# Patient Record
Sex: MALE | Race: WHITE | NOT HISPANIC OR LATINO | Employment: FULL TIME | URBAN - METROPOLITAN AREA
[De-identification: names, ages, dates, MRNs, and addresses within clinical notes are randomized per-mention and may not be internally consistent; named-entity substitution may affect disease eponyms.]

---

## 2018-02-15 ENCOUNTER — OFFICE VISIT (OUTPATIENT)
Dept: ENDOCRINOLOGY | Age: 58
End: 2018-02-15

## 2018-02-15 VITALS
SYSTOLIC BLOOD PRESSURE: 138 MMHG | BODY MASS INDEX: 28.85 KG/M2 | WEIGHT: 213 LBS | OXYGEN SATURATION: 94 % | HEIGHT: 72 IN | HEART RATE: 77 BPM | DIASTOLIC BLOOD PRESSURE: 78 MMHG

## 2018-02-15 DIAGNOSIS — G47.39 OTHER SLEEP APNEA: ICD-10-CM

## 2018-02-15 DIAGNOSIS — E23.0 HYPOGONADOTROPIC HYPOGONADISM (HCC): Primary | ICD-10-CM

## 2018-02-15 PROCEDURE — 99205 OFFICE O/P NEW HI 60 MIN: CPT | Performed by: INTERNAL MEDICINE

## 2018-02-15 RX ORDER — ATORVASTATIN CALCIUM 80 MG/1
80 TABLET, FILM COATED ORAL DAILY
COMMUNITY
End: 2018-02-15

## 2018-02-15 RX ORDER — TESTOSTERONE CYPIONATE 200 MG/ML
INJECTION, SOLUTION INTRAMUSCULAR
Refills: 0 | COMMUNITY
Start: 2018-01-31 | End: 2018-03-16 | Stop reason: SDUPTHER

## 2018-02-15 RX ORDER — CARVEDILOL 6.25 MG/1
6.25 TABLET ORAL 2 TIMES DAILY WITH MEALS
COMMUNITY
End: 2019-04-29

## 2018-02-15 RX ORDER — GLUCOSAMINE SULFATE 500 MG
CAPSULE ORAL
COMMUNITY
End: 2019-05-20

## 2018-02-15 NOTE — PROGRESS NOTES
Chief Complaint   Patient presents with   • Abnormal Lab     TESTOSTERONE    NEW PATIENT APPOINTMENT/ TESTOSTERONE    Favian Pierre,57 y.o. WM is here as new pt for low testosterone levels.    Pt's PCP checked his testosterone about 5 years as he was constantly getting tired in the afternoon, when his levels were low he was started on Testosterone patch and then androgel with no improvement.   He was then started on testosterone injections, has been on them since then. ( for about 4 years now)  Pt is currently on Testosterone 200 mg im 1 week, pt has taken increased dose for the last 2 weeks, last injection was on Saturday 2/10/2018.   His testosterone dose prior to this was 100 mg im 1 week. ( not exactly sure of the doses of his testosterone)    He still c/o fatigue at later part of the week after the injection. Reports that the fatigue is affecting his daily activities. Reports decent libido, decreased erections, still have morning erections, still has normal ejaculations. No change in shaving frequency. Attained puberty at the later age this his peers. Attained puberty at around 14 - 15. He has 2 kids of his own. No TBI, no hx of mumps, no injury to testes.   Currently not on any opiates, steroids, not on any diet supplements for muscle building.   Weight has been relatively stable in the last 2 - 3 years.   Never had Ct scan of brain.    Pt denied headaches, no nipple discharge or breast enlargement. No temporal loss of vision and not on atypical anti-psychotics.  No recent prostate exam and no PSA levels, no c/o increased urination, urgency or hematuria.       Never had sleep study.      Patient has cardiac defibrillator due to remote history of pericarditis and pericardial effusion.  He could not give me more details than this.  He is not compliant with his other medications other than the testosterone.  He is supposed to take metformin and Lipitor which he does not take as he doesn't believe in these  medications.    I have reviewed the patient's allergies, medicines, past medical hx, family hx and social hx in detail.    No past medical history on file.    No family history on file.    Social History     Social History   • Marital status: Unknown     Spouse name: N/A   • Number of children: N/A   • Years of education: N/A     Occupational History   • Not on file.     Social History Main Topics   • Smoking status: Not on file   • Smokeless tobacco: Not on file   • Alcohol use Not on file   • Drug use: Not on file   • Sexual activity: Not on file     Other Topics Concern   • Not on file     Social History Narrative   • No narrative on file       Allergies   Allergen Reactions   • Percocet [Oxycodone-Acetaminophen] Itching         Current Outpatient Prescriptions:   •  carvedilol (COREG) 6.25 MG tablet, Take 6.25 mg by mouth 2 (Two) Times a Day With Meals., Disp: , Rfl:   •  Glucosamine 500 MG capsule, Take  by mouth., Disp: , Rfl:   •  SPIRONOLACTONE PO, Take  by mouth., Disp: , Rfl:   •  Testosterone Cypionate (DEPOTESTOTERONE CYPIONATE) 200 MG/ML injection, , Disp: , Rfl: 0    Review of Systems   Constitutional: Positive for fatigue. Negative for fever.   HENT: Negative for facial swelling, nosebleeds, trouble swallowing and voice change.    Eyes: Negative for pain and redness.   Respiratory: Positive for shortness of breath and wheezing.    Cardiovascular: Negative for palpitations and leg swelling.   Gastrointestinal: Negative for abdominal pain, diarrhea and vomiting.   Endocrine: Negative for polydipsia and polyuria.   Genitourinary: Negative for decreased urine volume and frequency.   Musculoskeletal: Negative for joint swelling and neck pain.   Skin: Negative for rash.   Allergic/Immunologic: Negative for immunocompromised state.   Neurological: Negative for seizures and facial asymmetry.   Hematological: Does not bruise/bleed easily.   Psychiatric/Behavioral: Negative for agitation and confusion.  "      Objective:    /78  Pulse 77  Ht 182.9 cm (72\")  Wt 96.6 kg (213 lb)  SpO2 94%  BMI 28.89 kg/m2    Physical Exam   Constitutional: He is oriented to person, place, and time. He appears well-nourished.   HENT:   Head: Normocephalic and atraumatic.   Eyes: Conjunctivae and EOM are normal.   Neck: Normal range of motion. Neck supple. Carotid bruit is not present. No thyromegaly present.   Cardiovascular: Normal rate and normal heart sounds.    Defib present   Pulmonary/Chest: Effort normal and breath sounds normal. No stridor. No respiratory distress. He has no wheezes.   Abdominal: Soft. Bowel sounds are normal. He exhibits no distension. There is no tenderness.   Musculoskeletal: He exhibits no edema or tenderness.   Lymphadenopathy:     He has no cervical adenopathy.   Neurological: He is alert and oriented to person, place, and time.   Skin: Skin is warm and dry.   Psychiatric: He has a normal mood and affect. His behavior is normal.   Vitals reviewed.      Results Review:    I reviewed the patient's new clinical results.    No results found for any previous visit.    Favian was seen today for abnormal lab.    Diagnoses and all orders for this visit:    Hypogonadotropic hypogonadism  -     Iron and TIBC  -     Ferritin  -     Hemoglobin & Hematocrit, Blood  -     Testosterone  -     TestT+TestF+SHBG  -     PSA DIAGNOSTIC  -     Prolactin  -     Hemoglobin A1c  -     Basic Metabolic Panel  -     Ambulatory Referral to Pulmonology  -     Hemoglobin A1c  -     Lipid Panel  -     TSH  -     T4, Free    Other sleep apnea  -     Iron and TIBC  -     Ferritin  -     Hemoglobin & Hematocrit, Blood  -     Testosterone  -     TestT+TestF+SHBG  -     PSA DIAGNOSTIC  -     Prolactin  -     Hemoglobin A1c  -     Basic Metabolic Panel  -     Ambulatory Referral to Pulmonology  -     Hemoglobin A1c  -     Lipid Panel  -     TSH  -     T4, Free            Low Testosterone levels  Advised the patient to call and " let us know about his current testosterone dosage  Based on his current testosterone dosage and his lab work up further adjustments will be made.  In order to get to the bottom of his testosterone problem patient needs to stop his testosterone for at least 2-3 months and then have to reevaluate his entire testosterone panel.  Patient refused to do so.  Counseled the patient on the side effects of testosterone-cardiac side effects, blood clots, prostate hypertrophy, prostate cancer.  Patient voiced understanding and is willing to take that risk.    Will refer the patient to sleep study for the diagnosis of sleep apnea as it would get worse on testosterone replacement.    Will check lipid panel, HbA1c    Will check prolactin levels to see if that is affecting his testosterone panel  Will check iron panel to rule out hemachromatosis    Will check TSH, free T4 levels due to his history of chronic fatigue    If any of his hormonal workup is abnormal.  Will proceed with HPA axis work up along with a pituitary imaging.     Thank you for asking me to see your patient, Favian Pierre in consultation.    35 minutes out of 60 minutes face to face spent in counseling the patient extensively on current problem and treatment plan.    Eliseo Chin MD  02/15/18

## 2018-02-17 LAB
BUN SERPL-MCNC: 14 MG/DL (ref 6–20)
BUN/CREAT SERPL: 12.3 (ref 7–25)
CALCIUM SERPL-MCNC: 9.7 MG/DL (ref 8.6–10.5)
CHLORIDE SERPL-SCNC: 103 MMOL/L (ref 98–107)
CHOLEST SERPL-MCNC: 208 MG/DL (ref 0–200)
CO2 SERPL-SCNC: 27.3 MMOL/L (ref 22–29)
CREAT SERPL-MCNC: 1.14 MG/DL (ref 0.76–1.27)
FERRITIN SERPL-MCNC: 76.65 NG/ML (ref 30–400)
GFR SERPLBLD CREATININE-BSD FMLA CKD-EPI: 66 ML/MIN/1.73
GFR SERPLBLD CREATININE-BSD FMLA CKD-EPI: 80 ML/MIN/1.73
GLUCOSE SERPL-MCNC: 122 MG/DL (ref 65–99)
HBA1C MFR BLD: 6.81 % (ref 4.8–5.6)
HCT VFR BLD AUTO: 47.6 % (ref 40.4–52.2)
HDLC SERPL-MCNC: 32 MG/DL (ref 40–60)
HGB BLD-MCNC: 15.8 G/DL (ref 13.7–17.6)
INTERPRETATION: NORMAL
IRON SATN MFR SERPL: 34 % (ref 20–50)
IRON SERPL-MCNC: 128 MCG/DL (ref 59–158)
LDLC SERPL CALC-MCNC: 117 MG/DL (ref 0–100)
POTASSIUM SERPL-SCNC: 4.3 MMOL/L (ref 3.5–5.2)
PROLACTIN SERPL-MCNC: 9.5 NG/ML (ref 4–15.2)
PSA SERPL-MCNC: 0.23 NG/ML (ref 0–4)
SHBG SERPL-SCNC: 12.2 NMOL/L (ref 19.3–76.4)
SODIUM SERPL-SCNC: 142 MMOL/L (ref 136–145)
T4 FREE SERPL-MCNC: 1.11 NG/DL (ref 0.93–1.7)
TESTOST FREE SERPL-MCNC: 28.8 PG/ML (ref 7.2–24)
TESTOST SERPL-MCNC: 1196 NG/DL (ref 264–916)
TIBC SERPL-MCNC: 377 MCG/DL
TRIGL SERPL-MCNC: 295 MG/DL (ref 0–150)
TSH SERPL DL<=0.005 MIU/L-ACNC: 2.11 MIU/ML (ref 0.27–4.2)
UIBC SERPL-MCNC: 249 MCG/DL
VLDLC SERPL CALC-MCNC: 59 MG/DL (ref 5–40)

## 2018-02-19 RX ORDER — SYRINGE W-NEEDLE,DISPOSAB,3 ML 25GX5/8"
SYRINGE, EMPTY DISPOSABLE MISCELLANEOUS
Qty: 8 EACH | Refills: 11 | Status: SHIPPED | OUTPATIENT
Start: 2018-02-19

## 2018-02-19 RX ORDER — ATORVASTATIN CALCIUM 40 MG/1
40 TABLET, FILM COATED ORAL DAILY
Qty: 30 TABLET | Refills: 11 | Status: SHIPPED | OUTPATIENT
Start: 2018-02-19 | End: 2019-02-19

## 2018-02-19 NOTE — TELEPHONE ENCOUNTER
Called into patient pharmacy Testosterone cypionate 100 mg   Inject 1 mL into buttocks thigh or shoulder

## 2018-03-21 RX ORDER — TESTOSTERONE CYPIONATE 200 MG/ML
INJECTION, SOLUTION INTRAMUSCULAR
Qty: 4 ML | Refills: 5 | Status: ON HOLD | OUTPATIENT
Start: 2018-03-21 | End: 2019-08-02

## 2018-03-28 DIAGNOSIS — IMO0002 UNCONTROLLED TYPE 2 DIABETES MELLITUS WITH COMPLICATION, WITHOUT LONG-TERM CURRENT USE OF INSULIN: ICD-10-CM

## 2018-03-28 DIAGNOSIS — E78.5 HYPERLIPIDEMIA, UNSPECIFIED HYPERLIPIDEMIA TYPE: ICD-10-CM

## 2018-03-28 DIAGNOSIS — E23.0 HYPOGONADOTROPIC HYPOGONADISM (HCC): Primary | ICD-10-CM

## 2018-04-02 ENCOUNTER — RESULTS ENCOUNTER (OUTPATIENT)
Dept: ENDOCRINOLOGY | Age: 58
End: 2018-04-02

## 2018-04-02 DIAGNOSIS — E23.0 HYPOGONADOTROPIC HYPOGONADISM (HCC): ICD-10-CM

## 2018-04-02 DIAGNOSIS — IMO0002 UNCONTROLLED TYPE 2 DIABETES MELLITUS WITH COMPLICATION, WITHOUT LONG-TERM CURRENT USE OF INSULIN: ICD-10-CM

## 2018-04-02 DIAGNOSIS — E78.5 HYPERLIPIDEMIA, UNSPECIFIED HYPERLIPIDEMIA TYPE: ICD-10-CM

## 2018-04-11 ENCOUNTER — RESULTS ENCOUNTER (OUTPATIENT)
Dept: ENDOCRINOLOGY | Age: 58
End: 2018-04-11

## 2018-04-11 DIAGNOSIS — IMO0002 UNCONTROLLED TYPE 2 DIABETES MELLITUS WITH COMPLICATION, WITHOUT LONG-TERM CURRENT USE OF INSULIN: ICD-10-CM

## 2018-04-11 DIAGNOSIS — E23.0 HYPOGONADOTROPIC HYPOGONADISM (HCC): ICD-10-CM

## 2018-04-11 DIAGNOSIS — E78.5 HYPERLIPIDEMIA, UNSPECIFIED HYPERLIPIDEMIA TYPE: ICD-10-CM

## 2019-04-29 ENCOUNTER — OFFICE VISIT (OUTPATIENT)
Dept: FAMILY MEDICINE CLINIC | Facility: CLINIC | Age: 59
End: 2019-04-29

## 2019-04-29 VITALS
HEART RATE: 93 BPM | HEIGHT: 72 IN | TEMPERATURE: 98 F | WEIGHT: 220.8 LBS | OXYGEN SATURATION: 99 % | DIASTOLIC BLOOD PRESSURE: 86 MMHG | SYSTOLIC BLOOD PRESSURE: 138 MMHG | BODY MASS INDEX: 29.91 KG/M2

## 2019-04-29 DIAGNOSIS — K63.5 POLYP OF COLON, UNSPECIFIED PART OF COLON, UNSPECIFIED TYPE: ICD-10-CM

## 2019-04-29 DIAGNOSIS — C44.310 BASAL CELL CARCINOMA (BCC) OF SKIN OF FACE, UNSPECIFIED PART OF FACE: ICD-10-CM

## 2019-04-29 DIAGNOSIS — E29.1 HYPOGONADISM, TESTICULAR: Primary | ICD-10-CM

## 2019-04-29 DIAGNOSIS — I20.1 PRINZMETAL'S ANGINA (HCC): ICD-10-CM

## 2019-04-29 DIAGNOSIS — Z95.810 PRESENCE OF CARDIAC DEFIBRILLATOR: ICD-10-CM

## 2019-04-29 DIAGNOSIS — E78.5 HYPERLIPIDEMIA LDL GOAL <100: ICD-10-CM

## 2019-04-29 DIAGNOSIS — Z76.89 ENCOUNTER TO ESTABLISH CARE WITH NEW DOCTOR: ICD-10-CM

## 2019-04-29 PROBLEM — I10 ESSENTIAL HYPERTENSION: Status: ACTIVE | Noted: 2019-04-29

## 2019-04-29 PROCEDURE — 99204 OFFICE O/P NEW MOD 45 MIN: CPT | Performed by: FAMILY MEDICINE

## 2019-04-29 RX ORDER — AMLODIPINE BESYLATE 5 MG/1
5 TABLET ORAL DAILY
Qty: 30 TABLET | Refills: 5 | Status: SHIPPED | OUTPATIENT
Start: 2019-04-29 | End: 2019-05-20

## 2019-05-06 ENCOUNTER — LAB (OUTPATIENT)
Dept: FAMILY MEDICINE CLINIC | Facility: CLINIC | Age: 59
End: 2019-05-06

## 2019-05-06 DIAGNOSIS — R31.9 HEMATURIA, UNSPECIFIED TYPE: Primary | ICD-10-CM

## 2019-05-06 DIAGNOSIS — E29.1 HYPOGONADISM, TESTICULAR: ICD-10-CM

## 2019-05-06 DIAGNOSIS — E78.5 HYPERLIPIDEMIA LDL GOAL <100: ICD-10-CM

## 2019-05-06 DIAGNOSIS — I20.1 PRINZMETAL'S ANGINA (HCC): ICD-10-CM

## 2019-05-06 LAB
25(OH)D3 SERPL-MCNC: 21.2 NG/ML (ref 30–100)
ALBUMIN SERPL-MCNC: 4.6 G/DL (ref 3.5–5.2)
ALBUMIN/GLOB SERPL: 1.4 G/DL
ALP SERPL-CCNC: 64 U/L (ref 39–117)
ALT SERPL W P-5'-P-CCNC: 80 U/L (ref 1–41)
ANION GAP SERPL CALCULATED.3IONS-SCNC: 10.7 MMOL/L
AST SERPL-CCNC: 53 U/L (ref 1–40)
BASOPHILS # BLD AUTO: 0.09 10*3/MM3 (ref 0–0.2)
BASOPHILS NFR BLD AUTO: 1 % (ref 0–1.5)
BILIRUB BLD-MCNC: NEGATIVE MG/DL
BILIRUB SERPL-MCNC: 0.7 MG/DL (ref 0.2–1.2)
BUN BLD-MCNC: 12 MG/DL (ref 6–20)
BUN/CREAT SERPL: 10.7 (ref 7–25)
CALCIUM SPEC-SCNC: 9.5 MG/DL (ref 8.6–10.5)
CHLORIDE SERPL-SCNC: 97 MMOL/L (ref 98–107)
CHOLEST SERPL-MCNC: 237 MG/DL (ref 0–200)
CLARITY, POC: CLEAR
CO2 SERPL-SCNC: 29.3 MMOL/L (ref 22–29)
COLOR UR: YELLOW
CREAT BLD-MCNC: 1.12 MG/DL (ref 0.76–1.27)
DEPRECATED RDW RBC AUTO: 47.5 FL (ref 37–54)
EOSINOPHIL # BLD AUTO: 0.21 10*3/MM3 (ref 0–0.4)
EOSINOPHIL NFR BLD AUTO: 2.3 % (ref 0.3–6.2)
ERYTHROCYTE [DISTWIDTH] IN BLOOD BY AUTOMATED COUNT: 13.6 % (ref 12.3–15.4)
GFR SERPL CREATININE-BSD FRML MDRD: 67 ML/MIN/1.73
GLOBULIN UR ELPH-MCNC: 3.2 GM/DL
GLUCOSE BLD-MCNC: 218 MG/DL (ref 65–99)
GLUCOSE UR STRIP-MCNC: ABNORMAL MG/DL
HBA1C MFR BLD: 7.3 % (ref 4.8–5.6)
HCT VFR BLD AUTO: 56.1 % (ref 37.5–51)
HDLC SERPL-MCNC: 33 MG/DL (ref 40–60)
HGB BLD-MCNC: 18 G/DL (ref 13–17.7)
IMM GRANULOCYTES # BLD AUTO: 0.03 10*3/MM3 (ref 0–0.05)
IMM GRANULOCYTES NFR BLD AUTO: 0.3 % (ref 0–0.5)
KETONES UR QL: NEGATIVE
LDLC SERPL CALC-MCNC: 127 MG/DL (ref 0–100)
LDLC/HDLC SERPL: 3.84 {RATIO}
LEUKOCYTE EST, POC: NEGATIVE
LYMPHOCYTES # BLD AUTO: 3.38 10*3/MM3 (ref 0.7–3.1)
LYMPHOCYTES NFR BLD AUTO: 37.6 % (ref 19.6–45.3)
MCH RBC QN AUTO: 30.8 PG (ref 26.6–33)
MCHC RBC AUTO-ENTMCNC: 32.1 G/DL (ref 31.5–35.7)
MCV RBC AUTO: 96.1 FL (ref 79–97)
MONOCYTES # BLD AUTO: 0.76 10*3/MM3 (ref 0.1–0.9)
MONOCYTES NFR BLD AUTO: 8.4 % (ref 5–12)
NEUTROPHILS # BLD AUTO: 4.53 10*3/MM3 (ref 1.7–7)
NEUTROPHILS NFR BLD AUTO: 50.4 % (ref 42.7–76)
NITRITE UR-MCNC: NEGATIVE MG/ML
NRBC BLD AUTO-RTO: 0 /100 WBC (ref 0–0.2)
PH UR: 6 [PH] (ref 5–8)
PLATELET # BLD AUTO: 164 10*3/MM3 (ref 140–450)
PMV BLD AUTO: 10.5 FL (ref 6–12)
POTASSIUM BLD-SCNC: 4.4 MMOL/L (ref 3.5–5.2)
PROT SERPL-MCNC: 7.8 G/DL (ref 6–8.5)
PROT UR STRIP-MCNC: ABNORMAL MG/DL
RBC # BLD AUTO: 5.84 10*6/MM3 (ref 4.14–5.8)
RBC # UR STRIP: ABNORMAL /UL
SODIUM BLD-SCNC: 137 MMOL/L (ref 136–145)
SP GR UR: 1.02 (ref 1–1.03)
TESTOST SERPL-MCNC: 497 NG/DL (ref 193–740)
TRIGL SERPL-MCNC: 386 MG/DL (ref 0–150)
TSH SERPL DL<=0.05 MIU/L-ACNC: 6.02 MIU/ML (ref 0.27–4.2)
UROBILINOGEN UR QL: NORMAL
VIT B12 BLD-MCNC: 743 PG/ML (ref 211–946)
VLDLC SERPL-MCNC: 77.2 MG/DL (ref 5–40)
WBC NRBC COR # BLD: 9 10*3/MM3 (ref 3.4–10.8)

## 2019-05-06 PROCEDURE — 81003 URINALYSIS AUTO W/O SCOPE: CPT | Performed by: FAMILY MEDICINE

## 2019-05-06 PROCEDURE — 83036 HEMOGLOBIN GLYCOSYLATED A1C: CPT | Performed by: FAMILY MEDICINE

## 2019-05-06 PROCEDURE — 80061 LIPID PANEL: CPT | Performed by: FAMILY MEDICINE

## 2019-05-06 PROCEDURE — 82306 VITAMIN D 25 HYDROXY: CPT | Performed by: FAMILY MEDICINE

## 2019-05-06 PROCEDURE — 84443 ASSAY THYROID STIM HORMONE: CPT | Performed by: FAMILY MEDICINE

## 2019-05-06 PROCEDURE — 87086 URINE CULTURE/COLONY COUNT: CPT | Performed by: FAMILY MEDICINE

## 2019-05-06 PROCEDURE — 80053 COMPREHEN METABOLIC PANEL: CPT | Performed by: FAMILY MEDICINE

## 2019-05-06 PROCEDURE — 82607 VITAMIN B-12: CPT | Performed by: FAMILY MEDICINE

## 2019-05-06 PROCEDURE — 85025 COMPLETE CBC W/AUTO DIFF WBC: CPT | Performed by: FAMILY MEDICINE

## 2019-05-06 PROCEDURE — 84403 ASSAY OF TOTAL TESTOSTERONE: CPT | Performed by: FAMILY MEDICINE

## 2019-05-07 ENCOUNTER — TELEPHONE (OUTPATIENT)
Dept: FAMILY MEDICINE CLINIC | Facility: CLINIC | Age: 59
End: 2019-05-07

## 2019-05-07 NOTE — PROGRESS NOTES
Notify the patient that he needs to return to the office to discuss his lab results.  He appears to have diabetes and low thyroid we will need to recheck labs when he returns.  We will have to start him on medications for diabetes.

## 2019-05-07 NOTE — TELEPHONE ENCOUNTER
----- Message from Ciara Le MD sent at 5/7/2019  3:46 PM EDT -----  Notify the patient that he needs to return to the office to discuss his lab results.  He appears to have diabetes and low thyroid we will need to recheck labs when he returns.  We will have to start him on medications for diabetes.

## 2019-05-08 LAB — BACTERIA SPEC AEROBE CULT: NO GROWTH

## 2019-05-20 ENCOUNTER — CONSULT (OUTPATIENT)
Dept: CARDIOLOGY | Facility: CLINIC | Age: 59
End: 2019-05-20

## 2019-05-20 ENCOUNTER — OFFICE VISIT (OUTPATIENT)
Dept: FAMILY MEDICINE CLINIC | Facility: CLINIC | Age: 59
End: 2019-05-20

## 2019-05-20 VITALS
HEART RATE: 88 BPM | HEIGHT: 72 IN | OXYGEN SATURATION: 99 % | BODY MASS INDEX: 29.66 KG/M2 | DIASTOLIC BLOOD PRESSURE: 86 MMHG | TEMPERATURE: 97.8 F | WEIGHT: 219 LBS | SYSTOLIC BLOOD PRESSURE: 138 MMHG

## 2019-05-20 VITALS
HEART RATE: 102 BPM | DIASTOLIC BLOOD PRESSURE: 82 MMHG | HEIGHT: 72 IN | BODY MASS INDEX: 29.8 KG/M2 | WEIGHT: 220 LBS | SYSTOLIC BLOOD PRESSURE: 150 MMHG

## 2019-05-20 DIAGNOSIS — I20.9 ANGINA PECTORIS (HCC): ICD-10-CM

## 2019-05-20 DIAGNOSIS — I42.8 OTHER CARDIOMYOPATHY (HCC): ICD-10-CM

## 2019-05-20 DIAGNOSIS — E03.9 HYPOTHYROIDISM, UNSPECIFIED TYPE: ICD-10-CM

## 2019-05-20 DIAGNOSIS — E11.9 TYPE 2 DIABETES MELLITUS WITHOUT COMPLICATION, WITHOUT LONG-TERM CURRENT USE OF INSULIN (HCC): Primary | ICD-10-CM

## 2019-05-20 DIAGNOSIS — E29.1 HYPOGONADISM, TESTICULAR: ICD-10-CM

## 2019-05-20 DIAGNOSIS — Z11.59 ENCOUNTER FOR HEPATITIS C SCREENING TEST FOR LOW RISK PATIENT: ICD-10-CM

## 2019-05-20 DIAGNOSIS — I10 ESSENTIAL HYPERTENSION: ICD-10-CM

## 2019-05-20 DIAGNOSIS — I10 ESSENTIAL HYPERTENSION: Primary | ICD-10-CM

## 2019-05-20 PROCEDURE — 80048 BASIC METABOLIC PNL TOTAL CA: CPT | Performed by: FAMILY MEDICINE

## 2019-05-20 PROCEDURE — 93284 PRGRMG EVAL IMPLANTABLE DFB: CPT | Performed by: INTERNAL MEDICINE

## 2019-05-20 PROCEDURE — 99203 OFFICE O/P NEW LOW 30 MIN: CPT | Performed by: INTERNAL MEDICINE

## 2019-05-20 PROCEDURE — 84480 ASSAY TRIIODOTHYRONINE (T3): CPT | Performed by: FAMILY MEDICINE

## 2019-05-20 PROCEDURE — 84439 ASSAY OF FREE THYROXINE: CPT | Performed by: FAMILY MEDICINE

## 2019-05-20 PROCEDURE — 86376 MICROSOMAL ANTIBODY EACH: CPT | Performed by: FAMILY MEDICINE

## 2019-05-20 PROCEDURE — 84443 ASSAY THYROID STIM HORMONE: CPT | Performed by: FAMILY MEDICINE

## 2019-05-20 PROCEDURE — 86803 HEPATITIS C AB TEST: CPT | Performed by: FAMILY MEDICINE

## 2019-05-20 PROCEDURE — 99214 OFFICE O/P EST MOD 30 MIN: CPT | Performed by: FAMILY MEDICINE

## 2019-05-20 RX ORDER — NEBIVOLOL HYDROCHLORIDE 10 MG/1
10 TABLET ORAL DAILY
Qty: 90 TABLET | Refills: 3 | Status: SHIPPED | OUTPATIENT
Start: 2019-05-20 | End: 2019-07-22

## 2019-05-20 RX ORDER — METFORMIN HYDROCHLORIDE 500 MG/1
500 TABLET, EXTENDED RELEASE ORAL
Qty: 90 TABLET | Refills: 3 | Status: SHIPPED | OUTPATIENT
Start: 2019-05-20 | End: 2019-05-20

## 2019-05-20 RX ORDER — CARVEDILOL 6.25 MG/1
6.25 TABLET ORAL DAILY
COMMUNITY
End: 2019-05-20 | Stop reason: ALTCHOICE

## 2019-05-20 NOTE — PROGRESS NOTES
Subjective   Favian Pierre is a 58 y.o. male.     Pt is here to fu on recent lab results.    Diabetes   He presents for his initial diabetic visit. He has type 2 diabetes mellitus. His disease course has been worsening. There are no hypoglycemic associated symptoms. Pertinent negatives for hypoglycemia include no confusion, dizziness, headaches, mood changes or speech difficulty. There are no diabetic associated symptoms. Pertinent negatives for diabetes include no fatigue and no visual change. There are no hypoglycemic complications. Symptoms are stable. There are no diabetic complications. Risk factors for coronary artery disease include diabetes mellitus and dyslipidemia. He is compliant with treatment none of the time. His weight is increasing steadily. He is following a generally unhealthy diet. When asked about meal planning, he reported none. He has not had a previous visit with a dietitian. He participates in exercise intermittently. There is no change in his home blood glucose trend. An ACE inhibitor/angiotensin II receptor blocker is not being taken. He does not see a podiatrist.Eye exam is not current.   Hypothyroidism   This is a new problem. The problem occurs intermittently. The problem has been unchanged. Pertinent negatives include no abdominal pain, change in bowel habit, congestion, coughing, diaphoresis, fatigue, headaches, numbness, rash or visual change. Nothing aggravates the symptoms. He has tried nothing for the symptoms. The treatment provided no relief.        The following portions of the patient's history were reviewed and updated as appropriate: allergies, current medications, past family history, past medical history, past social history, past surgical history and problem list.    Review of Systems   Constitutional: Negative for diaphoresis and fatigue.   HENT: Negative for congestion.    Respiratory: Negative for cough.    Gastrointestinal: Negative for abdominal pain and change in  "bowel habit.   Skin: Negative for rash.   Neurological: Negative for dizziness, speech difficulty, numbness and headaches.   Psychiatric/Behavioral: Negative for confusion.       Objective     Vitals:    05/20/19 1035   BP: 138/86   Pulse: 88   Temp: 97.8 °F (36.6 °C)   SpO2: 99%   Weight: 99.3 kg (219 lb)   Height: 182.9 cm (72\")       Physical Exam   Constitutional: He is oriented to person, place, and time. He appears well-developed and well-nourished.   HENT:   Head: Normocephalic and atraumatic.   Eyes: Conjunctivae and EOM are normal. Pupils are equal, round, and reactive to light. Right eye exhibits no discharge. Left eye exhibits no discharge. No scleral icterus.   Neck: Normal range of motion. Neck supple. No JVD present. No tracheal deviation present. No thyromegaly present.   Cardiovascular: Normal rate, regular rhythm and normal heart sounds. Exam reveals no gallop and no friction rub.   No murmur heard.  Pulmonary/Chest: Effort normal and breath sounds normal. No respiratory distress. He has no wheezes. He has no rales. He exhibits no tenderness.   Abdominal: Soft. Bowel sounds are normal. He exhibits no distension and no mass. There is no tenderness.   Musculoskeletal: Normal range of motion. He exhibits no edema.   Neurological: He is alert and oriented to person, place, and time. He has normal reflexes. He displays normal reflexes. No cranial nerve deficit. Coordination normal.   Skin: Skin is warm and dry.   Psychiatric: He has a normal mood and affect. His behavior is normal. Judgment and thought content normal.   Nursing note and vitals reviewed.      Assessment/Plan     Problem List Items Addressed This Visit        Cardiovascular and Mediastinum    Essential hypertension       Endocrine    Hypogonadism, testicular    Relevant Orders    Ambulatory Referral to Urology    Hypothyroidism    Relevant Orders    TSH (Completed)    T4, Free (Completed)    T3 (Completed)    Thyroid Peroxidase Antibody    " Type 2 diabetes mellitus without complication, without long-term current use of insulin (CMS/HCC) - Primary    Relevant Orders    Basic Metabolic Panel (Completed)    Ambulatory Referral to Diabetic Education       Other    Encounter for hepatitis C screening test for low risk patient    Relevant Orders    Hepatitis C Antibody (Completed)

## 2019-05-20 NOTE — PROGRESS NOTES
"Subjective:     Encounter Date:05/20/2019    Primary Care Physician: Ciara Le MD      Patient ID: Favian Pierre is a 58 y.o. male.    Chief Complaint:Chest Pain (Consult)    PROBLEM LIST:  1. NICM  a. 11/2015 cardiomyopathy  b. 2016 [facility] ICD implant  c. EF 45% by LV gram 2018.  2. Angina, microvascular  a. cardiac cath [facility] 2017 50% RCA stenosis with normal FFR 0.93  3. Hypertension  4. Dyslipidemia  5. Hypothyroidism  6. Skin cancer with removal  7. Sleep apnea  8. Surgeries:  a. C-spine surgery  b. Right inguinal hernia repair  c. Right leg surgery  d. Appendectomy  e. Lumbar fusion      Allergies   Allergen Reactions   • Percocet [Oxycodone-Acetaminophen] Itching         Current Outpatient Medications:   •  carvedilol (COREG) 6.25 MG tablet, Take 6.25 mg by mouth Daily., Disp: , Rfl:   •  Syringe 22G X 1\" 3 ML misc, Use with testosterone injections, Disp: 8 each, Rfl: 11  •  Syringe/Needle, Disp, 18G X 1\" 1 ML misc, Use with testosterone injection, Disp: 8 each, Rfl: 11  •  Testosterone Cypionate (DEPOTESTOTERONE CYPIONATE) 200 MG/ML injection, Inject 1 ML once weekly into buttocks thigh or shoulder, Disp: 4 mL, Rfl: 5  •  TESTOSTERONE CYPIONATE IM, Inject 1 mL into the shoulder, thigh, or buttocks 1 (One) Time Per Week., Disp: , Rfl:   No current facility-administered medications for this visit.         History of Present Illness    Patient is a 58-year-old  male who we are seeing today for status from cardiac care.  His previous history of nonischemic cardia myopathy with previous ICD implant.  He has had recovery of his EF.  His last check in 2018 had an ejection fraction of 45%.  Patient describes over the last year or so exertional chest discomfort with physical activity significantly strenuous activity.  Patient notes that he will normally have this discomfort and use sublingual nitroglycerin and is almost immediately resolved.  He denies any syncope, near-syncope, or " "edema.  Denies any increased shortness of breath.  He has been evaluated in [place] as well as at [place].  He has had negative ischemic evaluations with noted no significant coronary disease by cardiac catheterization in October 2018.  Patient was tried on Imdur therapy and notes that it \"worked for a little while\" however, then he began to notice symptoms returning.  He then went back to taking his sublingual nitroglycerin.  He for time also stopped all of his medications altogether.  Notes that for short time he felt well but then symptoms returned.  He is now back on Coreg but is only taking this once a day.  Patient also notes, and showed me a picture of, purple fingertips, sometimes associated with cold exposure that turns red after rewarming.  Notes this often occurs simultaneously with his chest discomfort, but not consistently    The following portions of the patient's history were reviewed and updated as appropriate: allergies, current medications, past family history, past medical history, past social history, past surgical history and problem list.    Family History   Problem Relation Age of Onset   • Cancer Mother    • No Known Problems Father        Social History     Tobacco Use   • Smoking status: Current Every Day Smoker     Types: Electronic Cigarette   • Smokeless tobacco: Never Used   Substance Use Topics   • Alcohol use: Yes   • Drug use: No         Review of Systems   Constitution: Positive for malaise/fatigue. Negative for fever and weakness.   HENT: Negative for nosebleeds.    Eyes: Negative for redness and visual disturbance.   Cardiovascular: Positive for chest pain. Negative for orthopnea, palpitations and paroxysmal nocturnal dyspnea.   Respiratory: Positive for snoring. Negative for cough, sputum production and wheezing.    Hematologic/Lymphatic: Negative for bleeding problem.   Skin: Negative for flushing, itching and rash.   Musculoskeletal: Negative for falls, joint pain and muscle " "cramps.   Gastrointestinal: Negative for abdominal pain, diarrhea, heartburn, nausea and vomiting.   Genitourinary: Negative for hematuria.   Neurological: Positive for excessive daytime sleepiness. Negative for dizziness, headaches and tremors.   Psychiatric/Behavioral: Negative for substance abuse. The patient is not nervous/anxious.           Objective:   /82 (BP Location: Left arm, Patient Position: Sitting)   Pulse 102   Ht 182.9 cm (72\")   Wt 99.8 kg (220 lb)   BMI 29.84 kg/m²          Physical Exam   Constitutional: He is oriented to person, place, and time. He appears well-developed and well-nourished.   HENT:   Head: Normocephalic and atraumatic.   Mouth/Throat: Oropharynx is clear and moist.   Eyes: Conjunctivae are normal. Pupils are equal, round, and reactive to light.   Neck: Normal carotid pulses and no JVD present. Carotid bruit is not present. No thyromegaly present.   Cardiovascular: Normal rate, regular rhythm, S1 normal and S2 normal. Exam reveals no gallop and no friction rub.   No murmur heard.  Pulses:       Carotid pulses are 2+ on the right side, and 2+ on the left side.       Dorsalis pedis pulses are 2+ on the right side, and 2+ on the left side.        Posterior tibial pulses are 2+ on the right side, and 2+ on the left side.   Pulmonary/Chest: No respiratory distress. He has no wheezes. He has no rales. He exhibits no tenderness.   Abdominal: He exhibits no distension, no abdominal bruit and no mass. There is no hepatosplenomegaly. There is no tenderness. There is no rebound.   Musculoskeletal: He exhibits no edema, tenderness or deformity.   Lymphadenopathy:     He has no cervical adenopathy.   Neurological: He is alert and oriented to person, place, and time. He has normal strength.   Skin: Skin is warm and dry. No rash noted. No cyanosis. Nails show no clubbing.   Psychiatric: He has a normal mood and affect. Cognition and memory are normal.         ECG 12 Lead  Date/Time: " 5/20/2019 5:30 PM  Performed by: Jalil Guillen MD  Authorized by: Jalil Guillen MD   Rhythm: sinus tachycardia  Pacing: ventricular paced rhythm and atrial sensed rhythm        Device check: Patient less than 1% atrially paced, 100% biventricular paced.  Normal thresholds impedances.  Estimated battery voltage 10 years, charge time of 10 seconds.  Patient did have 3 episodes of ATP, each occurring in the setting of sinus tachycardia greater than 180 bpm.  No ventricular arrhythmias noted.  Patient was reprogrammed for a monitor only zone at 180, with an shock zone of greater than 220        Assessment:   Assessment/Plan      Favian was seen today for chest pain.    Diagnoses and all orders for this visit:    Essential hypertension    Other cardiomyopathy (CMS/HCC)    Angina pectoris (CMS/HCC)    Other orders  -     ECG 12 Lead      1.  History of nonischemic cardiomyopathy, with normal cardiac cath x2.  Per patient original EF less than 15% (records unavailable), with last LVEF of near normal.  Currently off medical therapy without complaints of dyspnea or heart failure.  He is functional class I.  2.  Angina, with normal coronary arteries.  This likely represents microvascular angina.  Likely exacerbated by his persistent tachycardia and extreme sinus tachycardia with exercise.  This is responsive to sublingual nitroglycerin  3.  Normal functioning by V ICD.  Inappropriate ATP due to sinus tachycardia for which the device was reprogrammed  4.  Hypertension  5.  Raynaud's phenomenon  6.  Coronary artery disease, non-flow-limiting cardiac cath 2 years ago    Recommendations: At this time, we will not resume all of his anti-cardia myopathy medications given how well he is feeling, and his desire not to do so.  We will have her check an echocardiogram in 6 months time with his revisit.  We have appropriate reprogrammed his device to avoid inappropriate ATP for sinus tachycardia.  Discussed the his hypertension,  and sinus tachycardia being somewhat inappropriate for age, and this may be the primary factor in his angina which is presumed to be microvascular.  As such, will place him on Bystolic 10 mg daily (in the hopes of avoiding beta-blocker side effects, but also because of the some vasodilatory properties this may help with the ray nodes).  We will switch his device monitoring to us.  If patient's angina does not respond, will consider repeat ischemic evaluation if medical therapy fails given his prior non-flow-limiting disease.  Will need to check his fasting lipid profile.       Pao CASAREZ scribed portions of this dictation for Dr. Jalil Guillen.  I, Jalil Guillen MD, personally performed the services described in this documentation as scribed by the above individual in my presence, and it is both accurate and complete  Dictated utilizing Dragon dictation

## 2019-05-21 ENCOUNTER — TELEPHONE (OUTPATIENT)
Dept: FAMILY MEDICINE CLINIC | Facility: CLINIC | Age: 59
End: 2019-05-21

## 2019-05-21 LAB
ANION GAP SERPL CALCULATED.3IONS-SCNC: 15.2 MMOL/L
BUN BLD-MCNC: 13 MG/DL (ref 6–20)
BUN/CREAT SERPL: 12.3 (ref 7–25)
CALCIUM SPEC-SCNC: 9.7 MG/DL (ref 8.6–10.5)
CHLORIDE SERPL-SCNC: 96 MMOL/L (ref 98–107)
CO2 SERPL-SCNC: 24.8 MMOL/L (ref 22–29)
CREAT BLD-MCNC: 1.06 MG/DL (ref 0.76–1.27)
GFR SERPL CREATININE-BSD FRML MDRD: 72 ML/MIN/1.73
GLUCOSE BLD-MCNC: 226 MG/DL (ref 65–99)
HCV AB SER DONR QL: NORMAL
POTASSIUM BLD-SCNC: 4.3 MMOL/L (ref 3.5–5.2)
SODIUM BLD-SCNC: 136 MMOL/L (ref 136–145)
T3 SERPL-MCNC: 132 NG/DL (ref 80–200)
T4 FREE SERPL-MCNC: 1.03 NG/DL (ref 0.93–1.7)
TSH SERPL DL<=0.05 MIU/L-ACNC: 3.82 MIU/ML (ref 0.27–4.2)

## 2019-05-21 RX ORDER — BLOOD-GLUCOSE METER
1 KIT MISCELLANEOUS DAILY
Qty: 1 EACH | Refills: 0 | Status: SHIPPED | OUTPATIENT
Start: 2019-05-21 | End: 2020-05-20

## 2019-05-21 RX ORDER — LANCETS 23 GAUGE
1 EACH MISCELLANEOUS DAILY
Qty: 100 EACH | Refills: 0 | Status: SHIPPED | OUTPATIENT
Start: 2019-05-21

## 2019-05-21 NOTE — PROGRESS NOTES
The recheck of his thyroid level is normal.  His glucose is elevated.  He should go ahead and proceed with taking the metformin.  His kidney function is normal.  We will follow-up in 3 months as we discussed in the office.

## 2019-05-21 NOTE — TELEPHONE ENCOUNTER
PT NOTIFIED OF LAB RESULTS.  ----- Message from Ciara Le MD sent at 5/21/2019  8:29 AM EDT -----  The recheck of his thyroid level is normal.  His glucose is elevated.  He should go ahead and proceed with taking the metformin.  His kidney function is normal.  We will follow-up in 3 months as we discussed in the office.

## 2019-05-22 LAB — THYROPEROXIDASE AB SERPL-ACNC: 59 IU/ML (ref 0–34)

## 2019-05-23 ENCOUNTER — TELEPHONE (OUTPATIENT)
Dept: FAMILY MEDICINE CLINIC | Facility: CLINIC | Age: 59
End: 2019-05-23

## 2019-05-23 NOTE — TELEPHONE ENCOUNTER
----- Message from Roxie Sanchez MA sent at 5/22/2019  5:17 PM EDT -----  WILL YOU CALL THIS PT PLEASE. I DID NOT GET HIM. I DON'T WANT HIM TO HAVE TO WAIT UNTIL WE ARE BACK ON June 3RD FOR ME TO CALL AGAIN AND HE DOES NOT HAVE MYCHART SET UP.  ----- Message -----  From: Ciara Le MD  Sent: 5/22/2019   8:55 AM  To: Roxie Sanchez MA    He appears to have hashimoto's, so we will continue to monitor TSH every 3 months.

## 2019-05-30 ENCOUNTER — PRIOR AUTHORIZATION (OUTPATIENT)
Dept: CARDIOLOGY | Facility: CLINIC | Age: 59
End: 2019-05-30

## 2019-05-30 NOTE — TELEPHONE ENCOUNTER
Favian Pierre Key: [id number] - PA Case ID: [id number] - Rx #: [id number] Need help? Call us at [phone]  Outcome  Approvedtoday  CaseId:[id number];Status:Approved;Review Type:Prior Auth;Coverage Start Date:04/30/2019;Coverage End Date:12/31/2099;    Pharmacy notified, [place]enny [phone], per pharmacy co pay for 90 days= $53.00

## 2019-06-05 DIAGNOSIS — E03.9 HYPOTHYROIDISM, UNSPECIFIED TYPE: ICD-10-CM

## 2019-06-05 DIAGNOSIS — E29.1 HYPOGONADISM, TESTICULAR: Primary | ICD-10-CM

## 2019-07-21 NOTE — PROGRESS NOTES
"Subjective:     Encounter Date:07/22/2019    Primary Care Physician: Ciara Le MD      Patient ID: Favian Pierre is a 58 y.o. male.    Chief Complaint:Hypertension    PROBLEM LIST:  1. NICM  a. 11/2015 cardiomyopathy  b. 2016 [facility] ICD implant  c. EF 45% by LV gram 2018.  2. Angina, microvascular  a. cardiac cath [facility] 2017 50% RCA stenosis with normal FFR 0.93  3. Hypertension  4. Dyslipidemia  5. Ongoing tobacco abuse  6. Hypothyroidism  7. Skin cancer with removal  8. Sleep apnea  9. Surgeries:  a. C-spine surgery  b. Right inguinal hernia repair  c. Right leg surgery  d. Appendectomy  e. Lumbar fusion      Allergies   Allergen Reactions   • Percocet [Oxycodone-Acetaminophen] Itching         Current Outpatient Medications:   •  BYSTOLIC 10 MG tablet, Take 1 tablet by mouth Daily., Disp: 90 tablet, Rfl: 3  •  glucose blood test strip, Use as instructed, Disp: 100 each, Rfl: 12  •  glucose monitoring kit (FREESTYLE) monitoring kit, 1 each Daily., Disp: 1 each, Rfl: 0  •  Lancets 28G misc, 1 each Daily., Disp: 100 each, Rfl: 0  •  nitroglycerin (NITROSTAT) 0.4 MG SL tablet, Place 0.4 mg under the tongue Every 5 (Five) Minutes As Needed for Chest Pain. Take no more than 3 doses in 15 minutes., Disp: , Rfl:   •  Syringe 22G X 1\" 3 ML misc, Use with testosterone injections, Disp: 8 each, Rfl: 11  •  Syringe/Needle, Disp, 18G X 1\" 1 ML misc, Use with testosterone injection, Disp: 8 each, Rfl: 11  •  Testosterone Cypionate (DEPOTESTOTERONE CYPIONATE) 200 MG/ML injection, Inject 1 ML once weekly into buttocks thigh or shoulder, Disp: 4 mL, Rfl: 5        History of Present Illness    Patient returns today for 2-month follow-up of anginal symptoms.  Patient notes that since starting Bystolic he has had no significant change in his symptoms.  He also notes that it gave him diarrhea.  He still has exertional anginal symptoms with physical activity.  He is also had some intermittent symptoms at rest.  " "He was seen by a vascular surgeon who encouraged him to stop smoking and decrease his caffeine intake.  He has decreased his caffeine intake but still continues to smoke.  Denies any shortness of breath with physical activity.  He is also been experiencing some discomfort at rest.  This is alleviated with nitroglycerin.  No syncope, near-syncope, or edema.  Patient has previously been on isosorbide and did not help his symptoms at all.  Patient also feels that his Bystolic is costing him too much especially since it does not seem to be helping his symptoms.    The following portions of the patient's history were reviewed and updated as appropriate: allergies, current medications, past family history, past medical history, past social history, past surgical history and problem list.      Social History     Tobacco Use   • Smoking status: Current Every Day Smoker     Types: Electronic Cigarette   • Smokeless tobacco: Never Used   Substance Use Topics   • Alcohol use: Yes     Comment: seldom   • Drug use: No         Review of Systems   Constitution: Negative.   Cardiovascular: Positive for chest pain.   Respiratory: Positive for sleep disturbances due to breathing and snoring.    Hematologic/Lymphatic: Negative for bleeding problem. Does not bruise/bleed easily.   Skin: Negative for rash.   Musculoskeletal: Negative for muscle weakness and myalgias.   Gastrointestinal: Negative for heartburn, nausea and vomiting.   Neurological: Negative.    Allergic/Immunologic: Positive for environmental allergies.          Objective:    height is 182.9 cm (72\") and weight is 97.4 kg (214 lb 12.8 oz). His blood pressure is 140/80 and his pulse is 68. His oxygen saturation is 95%.         Physical Exam   Constitutional: He is oriented to person, place, and time. He appears well-developed and well-nourished.   Neck: No JVD present. No tracheal deviation present.   Cardiovascular: Normal rate, regular rhythm, normal heart sounds and " intact distal pulses. Exam reveals no friction rub.   No murmur heard.  Pulmonary/Chest: Effort normal and breath sounds normal.   Abdominal: Bowel sounds are normal. There is no tenderness.   Musculoskeletal: He exhibits no edema or deformity.   Neurological: He is alert and oriented to person, place, and time.       Procedures          Assessment:   Assessment/Plan      Favian was seen today for hypertension.    Diagnoses and all orders for this visit:    Unstable angina (CMS/[facility])  -     Case Request Cath Lab: Left Heart Cath    Essential hypertension    Dyslipidemia    Other orders  -     bisoprolol (ZEBeta) 5 MG tablet; Take 1 tablet by mouth Daily.  -     ranolazine (RANEXA) 500 MG 12 hr tablet; Take 1 tablet by mouth 2 (Two) Times a Day.      Plan:  1. Discontinue Bystolic and initiate Zebeta 5 mg daily.  2. Trial of Ranexa 500 mg twice daily.  3. Will refill sublingual nitroglycerin tablets.  4. Discussed with patient and wife today the possibility of attempting further trial of medication versus further ischemic evaluation.  At this time given his noted persistent and now at rest symptoms they wish for definitive answer via cardiac catheterization.  5. Discussed with patient importance of smoking cessation especially if this is microvascular angina.  6. We will proceed to cardiac catheterization.  Further recommendations to follow.       Pao CASAREZ     Dictated utilizing Dragon dictation

## 2019-07-22 ENCOUNTER — OFFICE VISIT (OUTPATIENT)
Dept: CARDIOLOGY | Facility: CLINIC | Age: 59
End: 2019-07-22

## 2019-07-22 VITALS
SYSTOLIC BLOOD PRESSURE: 140 MMHG | HEART RATE: 68 BPM | WEIGHT: 214.8 LBS | BODY MASS INDEX: 29.09 KG/M2 | OXYGEN SATURATION: 95 % | HEIGHT: 72 IN | DIASTOLIC BLOOD PRESSURE: 80 MMHG

## 2019-07-22 DIAGNOSIS — I20.0 UNSTABLE ANGINA (HCC): Primary | ICD-10-CM

## 2019-07-22 DIAGNOSIS — I10 ESSENTIAL HYPERTENSION: ICD-10-CM

## 2019-07-22 DIAGNOSIS — E78.5 DYSLIPIDEMIA: ICD-10-CM

## 2019-07-22 PROCEDURE — 99214 OFFICE O/P EST MOD 30 MIN: CPT | Performed by: NURSE PRACTITIONER

## 2019-07-22 RX ORDER — NITROGLYCERIN 0.4 MG/1
0.4 TABLET SUBLINGUAL
COMMUNITY
End: 2019-07-22 | Stop reason: SDUPTHER

## 2019-07-22 RX ORDER — BISOPROLOL FUMARATE 5 MG/1
5 TABLET, FILM COATED ORAL DAILY
Qty: 30 TABLET | Refills: 11 | Status: SHIPPED | OUTPATIENT
Start: 2019-07-22 | End: 2019-09-10 | Stop reason: SDUPTHER

## 2019-07-22 RX ORDER — RANOLAZINE 500 MG/1
500 TABLET, EXTENDED RELEASE ORAL 2 TIMES DAILY
Qty: 28 TABLET | Refills: 2 | Status: SHIPPED | OUTPATIENT
Start: 2019-07-22 | End: 2020-01-13

## 2019-07-22 RX ORDER — NITROGLYCERIN 0.6 MG/1
0.6 TABLET SUBLINGUAL
Qty: 25 TABLET | Refills: 5 | Status: SHIPPED | OUTPATIENT
Start: 2019-07-22 | End: 2020-02-24 | Stop reason: SDUPTHER

## 2019-08-02 ENCOUNTER — HOSPITAL ENCOUNTER (OUTPATIENT)
Facility: HOSPITAL | Age: 59
Setting detail: HOSPITAL OUTPATIENT SURGERY
Discharge: HOME | End: 2019-08-02
Attending: INTERNAL MEDICINE | Admitting: INTERNAL MEDICINE

## 2019-08-02 ENCOUNTER — APPOINTMENT (OUTPATIENT)
Dept: GENERAL RADIOLOGY | Facility: HOSPITAL | Age: 59
End: 2019-08-02

## 2019-08-02 VITALS
RESPIRATION RATE: 12 BRPM | WEIGHT: 212.52 LBS | BODY MASS INDEX: 28.79 KG/M2 | OXYGEN SATURATION: 94 % | TEMPERATURE: 97 F | SYSTOLIC BLOOD PRESSURE: 150 MMHG | HEIGHT: 72 IN | HEART RATE: 64 BPM | DIASTOLIC BLOOD PRESSURE: 88 MMHG

## 2019-08-02 DIAGNOSIS — I20.0 UNSTABLE ANGINA (HCC): ICD-10-CM

## 2019-08-02 LAB
ALBUMIN SERPL-MCNC: 4.4 G/DL (ref 3.5–5.2)
ALBUMIN/GLOB SERPL: 1.5 G/DL
ALP SERPL-CCNC: 61 U/L (ref 39–117)
ALT SERPL W P-5'-P-CCNC: 65 U/L (ref 1–41)
ANION GAP SERPL CALCULATED.3IONS-SCNC: 10 MMOL/L (ref 5–15)
AST SERPL-CCNC: 48 U/L (ref 1–40)
BILIRUB SERPL-MCNC: 0.3 MG/DL (ref 0.2–1.2)
BUN BLD-MCNC: 15 MG/DL (ref 6–20)
BUN/CREAT SERPL: 14.9 (ref 7–25)
CALCIUM SPEC-SCNC: 9.3 MG/DL (ref 8.6–10.5)
CHLORIDE SERPL-SCNC: 101 MMOL/L (ref 98–107)
CHOLEST SERPL-MCNC: 240 MG/DL (ref 0–200)
CO2 SERPL-SCNC: 27 MMOL/L (ref 22–29)
CREAT BLD-MCNC: 1.01 MG/DL (ref 0.76–1.27)
DEPRECATED RDW RBC AUTO: 40.4 FL (ref 37–54)
ERYTHROCYTE [DISTWIDTH] IN BLOOD BY AUTOMATED COUNT: 12.3 % (ref 12.3–15.4)
GFR SERPL CREATININE-BSD FRML MDRD: 76 ML/MIN/1.73
GLOBULIN UR ELPH-MCNC: 3 GM/DL
GLUCOSE BLD-MCNC: 147 MG/DL (ref 65–99)
GLUCOSE BLDC GLUCOMTR-MCNC: 139 MG/DL (ref 70–130)
HBA1C MFR BLD: 8.3 % (ref 4.8–5.6)
HCT VFR BLD AUTO: 47.1 % (ref 37.5–51)
HDLC SERPL-MCNC: 41 MG/DL (ref 40–60)
HGB BLD-MCNC: 16.2 G/DL (ref 13–17.7)
LDLC SERPL CALC-MCNC: 138 MG/DL (ref 0–100)
LDLC/HDLC SERPL: 3.37 {RATIO}
MCH RBC QN AUTO: 30.9 PG (ref 26.6–33)
MCHC RBC AUTO-ENTMCNC: 34.4 G/DL (ref 31.5–35.7)
MCV RBC AUTO: 89.9 FL (ref 79–97)
PLATELET # BLD AUTO: 161 10*3/MM3 (ref 140–450)
PMV BLD AUTO: 10.1 FL (ref 6–12)
POTASSIUM BLD-SCNC: 4 MMOL/L (ref 3.5–5.2)
PROT SERPL-MCNC: 7.4 G/DL (ref 6–8.5)
RBC # BLD AUTO: 5.24 10*6/MM3 (ref 4.14–5.8)
SODIUM BLD-SCNC: 138 MMOL/L (ref 136–145)
TRIGL SERPL-MCNC: 304 MG/DL (ref 0–150)
VLDLC SERPL-MCNC: 60.8 MG/DL
WBC NRBC COR # BLD: 6.96 10*3/MM3 (ref 3.4–10.8)

## 2019-08-02 PROCEDURE — 0 IOPAMIDOL PER 1 ML: Performed by: INTERNAL MEDICINE

## 2019-08-02 PROCEDURE — C1894 INTRO/SHEATH, NON-LASER: HCPCS | Performed by: INTERNAL MEDICINE

## 2019-08-02 PROCEDURE — C1769 GUIDE WIRE: HCPCS | Performed by: INTERNAL MEDICINE

## 2019-08-02 PROCEDURE — 99152 MOD SED SAME PHYS/QHP 5/>YRS: CPT | Performed by: INTERNAL MEDICINE

## 2019-08-02 PROCEDURE — 25010000002 HEPARIN (PORCINE) PER 1000 UNITS: Performed by: INTERNAL MEDICINE

## 2019-08-02 PROCEDURE — 93458 L HRT ARTERY/VENTRICLE ANGIO: CPT | Performed by: INTERNAL MEDICINE

## 2019-08-02 PROCEDURE — 83036 HEMOGLOBIN GLYCOSYLATED A1C: CPT | Performed by: NURSE PRACTITIONER

## 2019-08-02 PROCEDURE — 25010000002 FENTANYL CITRATE (PF) 100 MCG/2ML SOLUTION: Performed by: INTERNAL MEDICINE

## 2019-08-02 PROCEDURE — 25010000002 MIDAZOLAM PER 1 MG: Performed by: INTERNAL MEDICINE

## 2019-08-02 PROCEDURE — 85027 COMPLETE CBC AUTOMATED: CPT | Performed by: NURSE PRACTITIONER

## 2019-08-02 PROCEDURE — 80053 COMPREHEN METABOLIC PANEL: CPT | Performed by: NURSE PRACTITIONER

## 2019-08-02 PROCEDURE — 82962 GLUCOSE BLOOD TEST: CPT

## 2019-08-02 PROCEDURE — 36415 COLL VENOUS BLD VENIPUNCTURE: CPT

## 2019-08-02 PROCEDURE — 71045 X-RAY EXAM CHEST 1 VIEW: CPT

## 2019-08-02 PROCEDURE — 80061 LIPID PANEL: CPT | Performed by: NURSE PRACTITIONER

## 2019-08-02 RX ORDER — ASPIRIN 325 MG
325 TABLET ORAL ONCE
Status: COMPLETED | OUTPATIENT
Start: 2019-08-02 | End: 2019-08-02

## 2019-08-02 RX ORDER — SODIUM CHLORIDE 0.9 % (FLUSH) 0.9 %
3 SYRINGE (ML) INJECTION EVERY 12 HOURS SCHEDULED
Status: DISCONTINUED | OUTPATIENT
Start: 2019-08-02 | End: 2019-08-02 | Stop reason: HOSPADM

## 2019-08-02 RX ORDER — ATORVASTATIN CALCIUM 10 MG/1
10 TABLET, FILM COATED ORAL DAILY
Qty: 30 TABLET | Refills: 6 | Status: SHIPPED | OUTPATIENT
Start: 2019-08-02 | End: 2019-09-10 | Stop reason: SDUPTHER

## 2019-08-02 RX ORDER — ASPIRIN 81 MG/1
81 TABLET, CHEWABLE ORAL DAILY
Refills: 11
Start: 2019-08-02

## 2019-08-02 RX ORDER — NITROGLYCERIN 0.4 MG/1
0.4 TABLET SUBLINGUAL
Status: DISCONTINUED | OUTPATIENT
Start: 2019-08-02 | End: 2019-08-02 | Stop reason: HOSPADM

## 2019-08-02 RX ORDER — SODIUM CHLORIDE 0.9 % (FLUSH) 0.9 %
1-10 SYRINGE (ML) INJECTION AS NEEDED
Status: DISCONTINUED | OUTPATIENT
Start: 2019-08-02 | End: 2019-08-02 | Stop reason: HOSPADM

## 2019-08-02 RX ORDER — MIDAZOLAM HYDROCHLORIDE 1 MG/ML
INJECTION INTRAMUSCULAR; INTRAVENOUS AS NEEDED
Status: DISCONTINUED | OUTPATIENT
Start: 2019-08-02 | End: 2019-08-02 | Stop reason: HOSPADM

## 2019-08-02 RX ORDER — SODIUM CHLORIDE 9 MG/ML
1-3 INJECTION, SOLUTION INTRAVENOUS CONTINUOUS
Status: DISCONTINUED | OUTPATIENT
Start: 2019-08-02 | End: 2019-08-02 | Stop reason: HOSPADM

## 2019-08-02 RX ORDER — LIDOCAINE HYDROCHLORIDE 10 MG/ML
INJECTION, SOLUTION EPIDURAL; INFILTRATION; INTRACAUDAL; PERINEURAL AS NEEDED
Status: DISCONTINUED | OUTPATIENT
Start: 2019-08-02 | End: 2019-08-02 | Stop reason: HOSPADM

## 2019-08-02 RX ORDER — FENTANYL CITRATE 50 UG/ML
INJECTION, SOLUTION INTRAMUSCULAR; INTRAVENOUS AS NEEDED
Status: DISCONTINUED | OUTPATIENT
Start: 2019-08-02 | End: 2019-08-02 | Stop reason: HOSPADM

## 2019-08-02 RX ORDER — ASPIRIN 325 MG
325 TABLET, DELAYED RELEASE (ENTERIC COATED) ORAL DAILY
Status: DISCONTINUED | OUTPATIENT
Start: 2019-08-03 | End: 2019-08-02 | Stop reason: HOSPADM

## 2019-08-02 RX ORDER — ONDANSETRON 2 MG/ML
4 INJECTION INTRAMUSCULAR; INTRAVENOUS EVERY 6 HOURS PRN
Status: DISCONTINUED | OUTPATIENT
Start: 2019-08-02 | End: 2019-08-02 | Stop reason: HOSPADM

## 2019-08-02 RX ADMIN — ASPIRIN 325 MG ORAL TABLET 325 MG: 325 PILL ORAL at 07:11

## 2019-08-02 RX ADMIN — SODIUM CHLORIDE 3 ML/KG/HR: 9 INJECTION, SOLUTION INTRAVENOUS at 07:11

## 2019-08-02 NOTE — INTERVAL H&P NOTE
H&P reviewed. The patient was examined and there are no changes to the H&P.   Will likely need statin added after procedure.  LEIDA Temple

## 2019-08-05 ENCOUNTER — OFFICE VISIT (OUTPATIENT)
Dept: FAMILY MEDICINE CLINIC | Facility: CLINIC | Age: 59
End: 2019-08-05

## 2019-08-05 VITALS
BODY MASS INDEX: 28.69 KG/M2 | SYSTOLIC BLOOD PRESSURE: 136 MMHG | TEMPERATURE: 98 F | WEIGHT: 211.8 LBS | HEIGHT: 72 IN | OXYGEN SATURATION: 98 % | HEART RATE: 59 BPM | DIASTOLIC BLOOD PRESSURE: 88 MMHG

## 2019-08-05 DIAGNOSIS — Z95.810 PRESENCE OF CARDIAC DEFIBRILLATOR: ICD-10-CM

## 2019-08-05 DIAGNOSIS — I20.1 PRINZMETAL'S ANGINA (HCC): ICD-10-CM

## 2019-08-05 DIAGNOSIS — E06.3 HASHIMOTO'S DISEASE: ICD-10-CM

## 2019-08-05 DIAGNOSIS — E03.9 HYPOTHYROIDISM, UNSPECIFIED TYPE: ICD-10-CM

## 2019-08-05 DIAGNOSIS — E78.5 HYPERLIPIDEMIA LDL GOAL <100: ICD-10-CM

## 2019-08-05 DIAGNOSIS — E11.9 TYPE 2 DIABETES MELLITUS WITHOUT COMPLICATION, WITHOUT LONG-TERM CURRENT USE OF INSULIN (HCC): Primary | ICD-10-CM

## 2019-08-05 DIAGNOSIS — I10 ESSENTIAL HYPERTENSION: ICD-10-CM

## 2019-08-05 DIAGNOSIS — Z72.0 TOBACCO ABUSE: ICD-10-CM

## 2019-08-05 PROCEDURE — 99406 BEHAV CHNG SMOKING 3-10 MIN: CPT | Performed by: FAMILY MEDICINE

## 2019-08-05 PROCEDURE — 99214 OFFICE O/P EST MOD 30 MIN: CPT | Performed by: FAMILY MEDICINE

## 2019-08-05 RX ORDER — METFORMIN HYDROCHLORIDE EXTENDED-RELEASE TABLETS 1000 MG/1
1000 TABLET, FILM COATED, EXTENDED RELEASE ORAL
Qty: 90 TABLET | Refills: 3 | Status: SHIPPED | OUTPATIENT
Start: 2019-08-05 | End: 2019-08-05

## 2019-08-05 RX ORDER — METFORMIN HYDROCHLORIDE 500 MG/1
1000 TABLET, EXTENDED RELEASE ORAL
Qty: 180 TABLET | Refills: 3 | Status: SHIPPED | OUTPATIENT
Start: 2019-08-05 | End: 2019-09-23 | Stop reason: SDUPTHER

## 2019-08-05 NOTE — PROGRESS NOTES
Subjective   Favian Pierre is a 58 y.o. male.     Pt is here for routine fu.   Pt would like to discuss fasting fsbg readings being mid 200 to 277.  Pt a1c on 8/2 was 8.3.    Home bp readings has been 150s.    Diabetes   He presents for his follow-up diabetic visit. He has type 2 diabetes mellitus. The initial diagnosis of diabetes was made 3 months ago. His disease course has been worsening. There are no hypoglycemic associated symptoms. Pertinent negatives for hypoglycemia include no confusion, dizziness, headaches or nervousness/anxiousness. There are no diabetic associated symptoms. Pertinent negatives for diabetes include no chest pain, no fatigue, no polydipsia, no polyuria and no weakness. There are no hypoglycemic complications. Symptoms are stable. There are no diabetic complications. Risk factors for coronary artery disease include diabetes mellitus, dyslipidemia, family history and hypertension. Current diabetic treatment includes oral agent (monotherapy). He is compliant with treatment some of the time (4/7 days.  diarrhea. ). His weight is stable. He is following a generally healthy diet. His home blood glucose trend is increasing steadily. His breakfast blood glucose is taken between 8-9 am. His breakfast blood glucose range is generally >200 mg/dl.      fsgb 140-270.    pt had heart cath Friday.  He was started on asa and atorvastatin.     He was initially diagnosed with diabetes 5/2019 and was started on metformin.      The following portions of the patient's history were reviewed and updated as appropriate: allergies, current medications, past family history, past medical history, past social history, past surgical history and problem list.    Review of Systems   Constitutional: Negative for chills, fatigue, fever and unexpected weight change.   HENT: Negative for congestion, ear pain, nosebleeds, rhinorrhea, sinus pressure, sneezing, sore throat and trouble swallowing.    Eyes: Negative for  "itching and visual disturbance.   Respiratory: Negative for cough, chest tightness, shortness of breath and wheezing.    Cardiovascular: Negative for chest pain, palpitations and leg swelling.   Gastrointestinal: Negative for abdominal pain, anal bleeding, blood in stool, constipation, diarrhea, nausea and vomiting.   Endocrine: Negative for cold intolerance, heat intolerance, polydipsia and polyuria.   Genitourinary: Negative for difficulty urinating, frequency, hematuria and urgency.   Musculoskeletal: Negative for back pain, gait problem and myalgias.   Skin: Negative for rash and wound.   Neurological: Negative for dizziness, weakness, numbness and headaches.   Hematological: Negative for adenopathy. Does not bruise/bleed easily.   Psychiatric/Behavioral: Negative for agitation, confusion, decreased concentration and suicidal ideas. The patient is not nervous/anxious.        Objective     Vitals:    08/05/19 0817   BP: 136/88   Pulse: 59   Temp: 98 °F (36.7 °C)   SpO2: 98%   Weight: 96.1 kg (211 lb 12.8 oz)   Height: 182.9 cm (72\")       Physical Exam   Constitutional: He is oriented to person, place, and time. He appears well-developed and well-nourished.   HENT:   Head: Normocephalic and atraumatic.   Eyes: Conjunctivae and EOM are normal. Pupils are equal, round, and reactive to light. Right eye exhibits no discharge. Left eye exhibits no discharge. No scleral icterus.   Neck: Normal range of motion. Neck supple. No JVD present. No tracheal deviation present. No thyromegaly present.   Cardiovascular: Normal rate, regular rhythm and normal heart sounds. Exam reveals no gallop and no friction rub.   No murmur heard.  Pulmonary/Chest: Effort normal and breath sounds normal. No respiratory distress. He has no wheezes. He has no rales. He exhibits no tenderness.   Abdominal: Soft. Bowel sounds are normal. He exhibits no distension and no mass. There is no tenderness.   Musculoskeletal: Normal range of motion. He " exhibits no edema.   Neurological: He is alert and oriented to person, place, and time. He has normal reflexes. He displays normal reflexes. No cranial nerve deficit. Coordination normal.   Skin: Skin is warm and dry.   Psychiatric: He has a normal mood and affect. His behavior is normal. Judgment and thought content normal.   Nursing note and vitals reviewed.      Assessment/Plan     Problem List Items Addressed This Visit        Cardiovascular and Mediastinum    Prinzmetal's angina (CMS/[facility])    Hyperlipidemia LDL goal <100    Essential hypertension    Presence of cardiac defibrillator       Endocrine    Hypothyroidism    Type 2 diabetes mellitus without complication, without long-term current use of insulin (CMS/[facility]) - Primary    Relevant Medications    metFORMIN (FORTAMET) 1000 MG (OSM) 24 hr tablet    Empagliflozin 25 MG tablet    Other Relevant Orders    Ambulatory Referral to Diabetic Education    Hashimoto's disease       Other    Tobacco abuse    Current Assessment & Plan     Approximately 5 minutes was spent counseling patient on risks and benefits of smoking cessation.  Options for cessation were discussed, including medications and alternative therapies.                 Recommend diab eye exam.   Refer diab educator.  Counseled on med compliance.   Monitor bp control closely.   Will plan to continue to monitor tsh.   He has appt with endo regarding hypogonadism.

## 2019-08-05 NOTE — ASSESSMENT & PLAN NOTE
Approximately 5 minutes was spent counseling patient on risks and benefits of smoking cessation.  Options for cessation were discussed, including medications and alternative therapies.

## 2019-08-23 ENCOUNTER — CLINICAL SUPPORT NO REQUIREMENTS (OUTPATIENT)
Dept: CARDIOLOGY | Facility: CLINIC | Age: 59
End: 2019-08-23

## 2019-08-23 DIAGNOSIS — I50.41 ACUTE COMBINED SYSTOLIC AND DIASTOLIC CONGESTIVE HEART FAILURE (HCC): ICD-10-CM

## 2019-08-23 PROCEDURE — 93295 DEV INTERROG REMOTE 1/2/MLT: CPT | Performed by: INTERNAL MEDICINE

## 2019-08-23 PROCEDURE — 93296 REM INTERROG EVL PM/IDS: CPT | Performed by: INTERNAL MEDICINE

## 2019-09-10 DIAGNOSIS — Z12.11 COLON CANCER SCREENING: Primary | ICD-10-CM

## 2019-09-10 DIAGNOSIS — E11.9 TYPE 2 DIABETES MELLITUS WITHOUT COMPLICATION, WITHOUT LONG-TERM CURRENT USE OF INSULIN (HCC): ICD-10-CM

## 2019-09-10 RX ORDER — BISOPROLOL FUMARATE 5 MG/1
5 TABLET, FILM COATED ORAL DAILY
Qty: 90 TABLET | Refills: 1 | Status: SHIPPED | OUTPATIENT
Start: 2019-09-10 | End: 2020-05-04

## 2019-09-10 RX ORDER — ATORVASTATIN CALCIUM 10 MG/1
10 TABLET, FILM COATED ORAL DAILY
Qty: 90 TABLET | Refills: 1 | Status: SHIPPED | OUTPATIENT
Start: 2019-09-10 | End: 2020-05-15

## 2019-09-17 ENCOUNTER — HOSPITAL ENCOUNTER (OUTPATIENT)
Dept: DIABETES SERVICES | Facility: HOSPITAL | Age: 59
Setting detail: RECURRING SERIES
Discharge: HOME | End: 2019-09-17

## 2019-09-18 ENCOUNTER — HOSPITAL ENCOUNTER (OUTPATIENT)
Dept: DIABETES SERVICES | Facility: HOSPITAL | Age: 59
Setting detail: RECURRING SERIES
Discharge: HOME | End: 2019-09-18

## 2019-09-18 PROCEDURE — G0109 DIAB MANAGE TRN IND/GROUP: HCPCS | Performed by: DIETITIAN, REGISTERED

## 2019-09-19 DIAGNOSIS — I10 ESSENTIAL HYPERTENSION: Primary | ICD-10-CM

## 2019-09-19 DIAGNOSIS — E11.9 TYPE 2 DIABETES MELLITUS WITHOUT COMPLICATION, WITHOUT LONG-TERM CURRENT USE OF INSULIN (HCC): ICD-10-CM

## 2019-09-19 RX ORDER — AMLODIPINE BESYLATE 5 MG/1
5 TABLET ORAL DAILY
Qty: 30 TABLET | Refills: 0 | Status: SHIPPED | OUTPATIENT
Start: 2019-09-19 | End: 2019-09-23 | Stop reason: SDUPTHER

## 2019-09-19 RX ORDER — AMLODIPINE BESYLATE 5 MG/1
TABLET ORAL
COMMUNITY
Start: 2019-09-12 | End: 2019-09-19 | Stop reason: SDUPTHER

## 2019-09-23 ENCOUNTER — OFFICE VISIT (OUTPATIENT)
Dept: FAMILY MEDICINE CLINIC | Facility: CLINIC | Age: 59
End: 2019-09-23

## 2019-09-23 VITALS
SYSTOLIC BLOOD PRESSURE: 154 MMHG | HEIGHT: 72 IN | HEART RATE: 73 BPM | TEMPERATURE: 97.8 F | BODY MASS INDEX: 28.71 KG/M2 | DIASTOLIC BLOOD PRESSURE: 88 MMHG | OXYGEN SATURATION: 98 % | WEIGHT: 212 LBS

## 2019-09-23 DIAGNOSIS — F32.9 REACTIVE DEPRESSION: Primary | ICD-10-CM

## 2019-09-23 DIAGNOSIS — E11.9 TYPE 2 DIABETES MELLITUS WITHOUT COMPLICATION, WITHOUT LONG-TERM CURRENT USE OF INSULIN (HCC): ICD-10-CM

## 2019-09-23 DIAGNOSIS — I10 ESSENTIAL HYPERTENSION: ICD-10-CM

## 2019-09-23 PROCEDURE — 99213 OFFICE O/P EST LOW 20 MIN: CPT | Performed by: FAMILY MEDICINE

## 2019-09-23 RX ORDER — METFORMIN HYDROCHLORIDE 500 MG/1
1000 TABLET, EXTENDED RELEASE ORAL
Qty: 180 TABLET | Refills: 1 | Status: SHIPPED | OUTPATIENT
Start: 2019-09-23 | End: 2020-06-19

## 2019-09-23 RX ORDER — TRAZODONE HYDROCHLORIDE 50 MG/1
50 TABLET ORAL NIGHTLY
Qty: 30 TABLET | Refills: 3 | Status: SHIPPED | OUTPATIENT
Start: 2019-09-23 | End: 2019-11-11

## 2019-09-23 RX ORDER — BUPROPION HYDROCHLORIDE 150 MG/1
150 TABLET ORAL DAILY
Qty: 30 TABLET | Refills: 5 | Status: SHIPPED | OUTPATIENT
Start: 2019-09-23 | End: 2019-11-11

## 2019-09-23 RX ORDER — AMLODIPINE BESYLATE 5 MG/1
5 TABLET ORAL DAILY
Qty: 90 TABLET | Refills: 1 | Status: SHIPPED | OUTPATIENT
Start: 2019-09-23 | End: 2020-06-19

## 2019-09-23 NOTE — PROGRESS NOTES
Subjective   Favian Pierre is a 58 y.o. male.     Pt would like to discuss depression that started a couple months ago.    Depression   Visit Type: initial  Onset of symptoms: 1-4 weeks ago  Progression since onset: stable  Patient presents with the following symptoms: anhedonia, choking sensation, decreased concentration, depressed mood, excessive worry, fatigue, hypersomnia, impotence, insomnia, irritability, muscle tension and nervousness/anxiety.  Patient is not experiencing: chest pain, compulsions, confusion, dizziness, dry mouth, feelings of hopelessness, feelings of worthlessness, hyperventilation, malaise, memory impairment, nausea, obsessions, palpitations, panic, psychomotor agitation, psychomotor retardation, suicidal ideas, suicidal planning, thoughts of death, weight gain and weight loss.  Frequency of symptoms: most days   Severity: moderate   Aggravated by: nothing  Sleep quality: fair  Nighttime awakenings: occasional  No history of: anemia, anxiety/panic attacks, arrhythmia, asthma, bipolar disorder, chronic lung disease, depression, fibromyalgia, hyperthyroidism, suicide attempt, mental illness and substance abuse  Treatment tried: nothing           The following portions of the patient's history were reviewed and updated as appropriate: allergies, current medications, past family history, past medical history, past social history, past surgical history and problem list.    Review of Systems   Constitutional: Positive for irritability. Negative for weight gain and weight loss.   Respiratory: Positive for choking.    Cardiovascular: Negative for palpitations.   Genitourinary: Positive for impotence.   Psychiatric/Behavioral: Positive for decreased concentration. Negative for confusion, substance abuse and suicidal ideas. The patient is nervous/anxious and has insomnia.        Objective     Vitals:    09/23/19 1129   BP: 154/88   Pulse: 73   Temp: 97.8 °F (36.6 °C)   SpO2: 98%   Weight: 96.2  "kg (212 lb)   Height: 182.9 cm (72\")       Physical Exam   Constitutional: He appears well-developed and well-nourished.   Cardiovascular: Normal rate and regular rhythm. Exam reveals no friction rub.   No murmur heard.  Pulmonary/Chest: No stridor. No respiratory distress.   Skin: No erythema.   Psychiatric: He has a normal mood and affect. His behavior is normal. Thought content normal.   Nursing note and vitals reviewed.      Assessment/Plan     Problem List Items Addressed This Visit        Other    Reactive depression - Primary    Relevant Medications    buPROPion XL (WELLBUTRIN XL) 150 MG 24 hr tablet    traZODone (DESYREL) 50 MG tablet        He has tolerated Wellbutrin in past when he took it years ago without side effects.   Fu 4-6 weeks  Medication risk benefits and side effects discussed.   If sx worsen or he def si/hi call ems/[phone] or go to ed       "

## 2019-09-30 ENCOUNTER — OFFICE VISIT (OUTPATIENT)
Dept: FAMILY MEDICINE CLINIC | Facility: CLINIC | Age: 59
End: 2019-09-30

## 2019-09-30 VITALS
HEART RATE: 63 BPM | SYSTOLIC BLOOD PRESSURE: 126 MMHG | HEIGHT: 72 IN | BODY MASS INDEX: 28.85 KG/M2 | OXYGEN SATURATION: 98 % | WEIGHT: 213 LBS | TEMPERATURE: 98.1 F | DIASTOLIC BLOOD PRESSURE: 78 MMHG

## 2019-09-30 DIAGNOSIS — M77.12 LEFT LATERAL EPICONDYLITIS: Primary | ICD-10-CM

## 2019-09-30 PROCEDURE — 99213 OFFICE O/P EST LOW 20 MIN: CPT | Performed by: FAMILY MEDICINE

## 2019-09-30 RX ORDER — SODIUM, POTASSIUM,MAG SULFATES 17.5-3.13G
2 SOLUTION, RECONSTITUTED, ORAL ORAL TAKE AS DIRECTED
Qty: 354 ML | Refills: 0 | Status: SHIPPED | OUTPATIENT
Start: 2019-09-30 | End: 2020-12-14

## 2019-09-30 NOTE — PROGRESS NOTES
"Subjective   Favian Pierre is a 58 y.o. male.     Pt is here due to left elbow pain that feels like knife in bone has been bothering him for a couple months however became worse last Tuesday. Nothing makes worse. Only better with rest.  Taking OTC ibuprophen about 1000 mg daily.      Elbow Injury   This is a recurrent problem. The current episode started 1 to 4 weeks ago. The problem occurs constantly. The problem has been waxing and waning. Associated symptoms include fatigue. Pertinent negatives include no chest pain, chills, congestion, diaphoresis, headaches, myalgias, numbness, sore throat or weakness. Exacerbated by: movement. He has tried NSAIDs (similar sx years ago in [place] and he got steroid injections and sx resolved. ) for the symptoms. The treatment provided no relief.       The following portions of the patient's history were reviewed and updated as appropriate: allergies, current medications, past family history, past medical history, past social history, past surgical history and problem list.    Review of Systems   Constitutional: Positive for fatigue. Negative for chills, diaphoresis and weight loss.   HENT: Negative for congestion and sore throat.    Eyes: Negative for blurred vision.   Cardiovascular: Negative for chest pain.   Endocrine: Positive for polydipsia and polyuria. Negative for polyphagia.   Genitourinary: Positive for impotence.   Musculoskeletal: Negative for myalgias.   Skin: Negative for pallor.   Neurological: Negative for dizziness, tremors, seizures, speech difficulty, weakness, numbness and headaches.   Psychiatric/Behavioral: Negative for confusion. The patient is nervous/anxious.        Objective     Vitals:    09/30/19 1311   BP: 126/78   Pulse: 63   Temp: 98.1 °F (36.7 °C)   SpO2: 98%   Weight: 96.6 kg (213 lb)   Height: 182.9 cm (72\")       Physical Exam   Constitutional: He appears well-developed and well-nourished.   Musculoskeletal: He exhibits tenderness. "   Tenderness over left lateral radial insertion.  Pain with pronation.  Full rom of elbow.  No swelling at joint.  No cellulitis.       Nursing note and vitals reviewed.      Assessment/Plan     Problem List Items Addressed This Visit        Musculoskeletal and Integument    Left lateral epicondylitis - Primary    Relevant Orders    Ambulatory Referral to Orthopedic Surgery        Recommend tennis elbow strap, nsaids and ice.   Refer ortho.

## 2019-10-07 ENCOUNTER — OUTSIDE FACILITY SERVICE (OUTPATIENT)
Dept: GASTROENTEROLOGY | Facility: CLINIC | Age: 59
End: 2019-10-07

## 2019-10-07 PROCEDURE — 45385 COLONOSCOPY W/LESION REMOVAL: CPT | Performed by: INTERNAL MEDICINE

## 2019-10-07 PROCEDURE — 45380 COLONOSCOPY AND BIOPSY: CPT | Performed by: INTERNAL MEDICINE

## 2019-10-12 DIAGNOSIS — I10 ESSENTIAL HYPERTENSION: ICD-10-CM

## 2019-10-12 DIAGNOSIS — E11.9 TYPE 2 DIABETES MELLITUS WITHOUT COMPLICATION, WITHOUT LONG-TERM CURRENT USE OF INSULIN (HCC): ICD-10-CM

## 2019-10-14 RX ORDER — EMPAGLIFLOZIN 25 MG/1
TABLET, FILM COATED ORAL
Qty: 30 TABLET | Refills: 0 | OUTPATIENT
Start: 2019-10-14

## 2019-10-14 RX ORDER — AMLODIPINE BESYLATE 5 MG/1
5 TABLET ORAL DAILY
Qty: 30 TABLET | Refills: 0 | OUTPATIENT
Start: 2019-10-14

## 2019-10-17 ENCOUNTER — OFFICE VISIT (OUTPATIENT)
Dept: ORTHOPEDIC SURGERY | Facility: CLINIC | Age: 59
End: 2019-10-17

## 2019-10-17 VITALS — WEIGHT: 203.93 LBS | HEART RATE: 64 BPM | OXYGEN SATURATION: 98 % | BODY MASS INDEX: 27.62 KG/M2 | HEIGHT: 72 IN

## 2019-10-17 DIAGNOSIS — M25.522 LEFT ELBOW PAIN: Primary | ICD-10-CM

## 2019-10-17 DIAGNOSIS — M77.12 LATERAL EPICONDYLITIS OF LEFT ELBOW: ICD-10-CM

## 2019-10-17 PROCEDURE — 20551 NJX 1 TENDON ORIGIN/INSJ: CPT | Performed by: PHYSICIAN ASSISTANT

## 2019-10-17 PROCEDURE — 99204 OFFICE O/P NEW MOD 45 MIN: CPT | Performed by: PHYSICIAN ASSISTANT

## 2019-10-17 RX ADMIN — TRIAMCINOLONE ACETONIDE 40 MG: 40 INJECTION, SUSPENSION INTRA-ARTICULAR; INTRAMUSCULAR at 09:26

## 2019-10-17 RX ADMIN — LIDOCAINE HYDROCHLORIDE 1 ML: 10 INJECTION, SOLUTION EPIDURAL; INFILTRATION; INTRACAUDAL; PERINEURAL at 09:26

## 2019-10-17 NOTE — PROGRESS NOTES
[facility] [place] [facility] [place]    Subjective     Chief Complaint   Patient presents with   • Left Elbow - Pain        HPI  Favian Pierre is a 58 y.o. male.  Right-hand-dominant.  Patient presents for evaluation of left lateral elbow pain.  He reports is been ongoing for over 6 months.  No history of injury or trauma.  He endorses a pain scale of 5/10.  Severity the pain moderate.  Quality pain stabbing.  He is been using some ibuprofen for pain control.  Other treatment is included a Cho-Pat strap with no change in symptoms.  No reported numbness or tingling into the extremity.    Patient denies any mechanical symptoms such as locking or catching to the elbow.    No reported fever, chills, night sweats or other constitutional symptoms.    History reviewed. No pertinent past medical history.   Past Surgical History:   Procedure Laterality Date   • APPENDECTOMY     • CARDIAC CATHETERIZATION N/A 8/2/2019    Procedure: Left Heart Cath;  Surgeon: Jalil Guillen MD;  Location: [facility] INVASIVE LOCATION;  Service: Cardiovascular   • CARDIAC DEFIBRILLATOR PLACEMENT     • FIBULA TIBIA BONE GRAFT     • HERNIA REPAIR     • LUMBAR FUSION      4-6      Family History   Problem Relation Age of Onset   • Cancer Mother    • No Known Problems Father      Social History     Socioeconomic History   • Marital status:      Spouse name: Not on file   • Number of children: Not on file   • Years of education: Not on file   • Highest education level: Not on file   Tobacco Use   • Smoking status: Current Every Day Smoker     Types: Electronic Cigarette   • Smokeless tobacco: Never Used   Substance and Sexual Activity   • Alcohol use: Yes     Comment: seldom   • Drug use: No   • Sexual activity: Defer      Current Outpatient Medications on File Prior to Visit   Medication Sig Dispense Refill   • amLODIPine (NORVASC) 5 MG tablet Take 1 tablet by mouth Daily. 90 tablet 1   • aspirin 81 MG chewable tablet Chew 1  "tablet Daily.  11   • atorvastatin (LIPITOR) 10 MG tablet Take 1 tablet by mouth Daily. 90 tablet 1   • bisoprolol (ZEBeta) 5 MG tablet Take 1 tablet by mouth Daily. 90 tablet 1   • buPROPion XL (WELLBUTRIN XL) 150 MG 24 hr tablet Take 1 tablet by mouth Daily. 30 tablet 5   • Empagliflozin 25 MG tablet Take 25 mg by mouth Daily. 90 tablet 1   • glucose blood test strip Use as instructed 100 each 12   • glucose monitoring kit (FREESTYLE) monitoring kit 1 each Daily. 1 each 0   • Lancets 28G misc 1 each Daily. 100 each 0   • metFORMIN ER (GLUCOPHAGE-XR) 500 MG 24 hr tablet Take 2 tablets by mouth Daily With Breakfast. 180 tablet 1   • nitroglycerin (NITROSTAT) 0.6 MG SL tablet Place 1 tablet under the tongue Every 5 (Five) Minutes As Needed for Chest Pain. Take no more than 3 doses in 15 minutes. 25 tablet 5   • ranolazine (RANEXA) 500 MG 12 hr tablet Take 1 tablet by mouth 2 (Two) Times a Day. 28 tablet 2   • sodium-potassium-magnesium sulfates (SUPREP BOWEL PREP KIT) 17.5-3.13-1.6 GM/177ML solution oral solution Take 2 bottles by mouth Take As Directed. Do not eat the day before your procedure. If you didn't receive instructions call [phone]. 354 mL 0   • Syringe 22G X 1\" 3 ML misc Use with testosterone injections 8 each 11   • Syringe/Needle, Disp, 18G X 1\" 1 ML misc Use with testosterone injection 8 each 11   • traZODone (DESYREL) 50 MG tablet Take 1 tablet by mouth Every Night. 30 tablet 3     No current facility-administered medications on file prior to visit.       Allergies   Allergen Reactions   • Percocet [Oxycodone-Acetaminophen] Itching        The following portions of the patient's history were reviewed and updated as appropriate: allergies, current medications, past family history, past medical history, past social history, past surgical history and problem list.    Review of Systems   Constitutional: Negative.    HENT: Negative.    Eyes: Negative.    Respiratory: Negative.    Cardiovascular: " "Negative.    Gastrointestinal: Negative.    Endocrine: Negative.    Genitourinary: Negative.    Musculoskeletal: Positive for arthralgias.   Skin: Negative.    Allergic/Immunologic: Negative.    Neurological: Negative.    Hematological: Negative.    Psychiatric/Behavioral: Negative.         Objective      Physical Exam  Pulse 64   Ht 182.9 cm (72.01\")   Wt 92.5 kg (203 lb 14.8 oz)   SpO2 98%   BMI 27.65 kg/m²     Body mass index is 27.65 kg/m².    GENERAL APPEARANCE: awake, alert & oriented x 3, in no acute distress and well developed, well nourished  PSYCH: normal mood and affect  LUNGS:  breathing nonlabored, no wheezing  EYES: sclera anicteric, pupils equal  CARDIOVASCULAR: palpable pulses dorsalis pedis, palpable posterior tibial bilaterally. Capillary refill less than 2 seconds  INTEGUMENTARY: skin intact, no clubbing, cyanosis  NEUROLOGIC:  Normal Sensation         Ortho Exam  Peripheral Vascular   Left Upper Extremity    No cyanotic nail beds    Pink nail beds and rapid capillary refill   Palpation    Radial Pulse - Bilaterally normal    Neurologic   Sensory - Elbow   Inspection and Palpation:    Light touch: intact - right hand   Muscle Strength and Tone:    Left bicep - 5/5    Left tricep - 5-/5    Left wrist extensors - 4+ to 5-/5     Left wrist flexors - 5/5    Left intrinsics - 5/5    Musculoskeletal   Left Upper Extremity - Elbow   Inspection and Palpation     Tenderness -positive tenderness noted lateral aspect of the elbow over lateral epicondyle, rounding common extensor tendon, radial border olecranon (at insertion of triceps).    Effusion - none    Crepitus - none   Range of Motion    Flexion: AROM - 140 degrees    Extension - AROM - 0 degrees     Forearm supination: AROM - 85 degrees    Forearm pronation - AROM - 85 degrees   Instability    Left - tennis elbow test positive   Deformities/Malalignments/Discepancies - none   Functional testing:    Valgus stress test negative    Varus stress test " negative    Elbow flexion test negative    Tinel's sign at Cubital tunnel negative      Imaging/Studies  Ordered left elbow plain films.  Imaging read by Dr. Ellis.    Indication: Left elbow pain     Comparison: No prior xrays are available for comparison     IMPRESSION:      Left elbow 3 views: Radiographs demonstrate no acute bony process or fracture.  There is some mild degenerative changes along the radial aspect of the olecranon process best seen on the AP view.  Elbow joint is concentrically reduced.      Assessment/Plan        ICD-10-CM ICD-9-CM   1. Left elbow pain M25.522 719.42   2. Lateral epicondylitis of left elbow M77.12 726.32       Orders Placed This Encounter   Procedures   • Medium Joint Arthrocentesis: L elbow   • Ambulatory Referral to Physical Therapy Ortho, Evaluate and treat        -Left elbow pain with lateral epicondylitis.  -Offered and accepted corticosteroid injection to the lateral epicondyle.  Injection was given today.  -Ordered formal PT.  -Recommend over-the-counter pain medication as needed.  -He also has some mild arthritic changes in the area which could be causing some of the pain is noted to the elbow.  -Follow-up in 6 weeks for repeat evaluation, sooner if issues arise or symptoms worsen/change.  -Question and concerns answered.    After discussing the risks, benefits, indications of injection, the patient gave consent to proceed.  Left elbow lateral epicondyle was confirmed as the correct site to be injected with a timeout.  It was then prepped using Hibiclens and injected with a mixture of 1 cc of 1% plain lidocaine and 1 cc of Kenalog (40 mg per mL), without any resistance through the lateral approach, patient in seated position.  Area was cleaned, hemostasis was achieved and a Band-Aid was applied over the injection site.  The patient tolerated procedure well.  I instructed the patient on signs and symptoms of infection.  They should report to the emergency department or  return to clinic if any of these develop, for further evaluation and treatment.  Recommended modifying activity for the next 48 hours to include rest, ice, elevation and oral pain medication as needed.  Discussed postinjection pain control as well as blood sugar monitoring following the corticosteroid injection with the patient.    Medical Decision Making  Management Options : prescription/IM medicine and physical/occupational therapy  Data/Risk: radiology tests       Christine Montenegro PA-C  10/18/19  3:54 PM         EMR Dragon/Transcription disclaimer:  Much of this encounter note is an electronic transcription of spoken language to printed text. Electronic transcription of spoken language may permit erroneous, or at times, nonsensical words or phrases to be inadvertently transcribed. Although I have reviewed the note for such errors, some may still exist.

## 2019-10-17 NOTE — PROGRESS NOTES
Procedure   Medium Joint Arthrocentesis: L elbow  Date/Time: 10/17/2019 9:26 AM  Consent given by: patient  Site marked: site marked  Timeout: Immediately prior to procedure a time out was called to verify the correct patient, procedure, equipment, support staff and site/side marked as required   Supporting Documentation  Indications: pain   Procedure Details  Location: elbow - L elbow (LATERAL EPICONDYLE)  Preparation: Patient was prepped and draped in the usual sterile fashion  Needle size: 25 G (short)  Approach: anterolateral  Medications administered: 40 mg triamcinolone acetonide 40 MG/ML; 1 mL lidocaine PF 1% 1 %  Patient tolerance: patient tolerated the procedure well with no immediate complications

## 2019-10-18 RX ORDER — TRIAMCINOLONE ACETONIDE 40 MG/ML
40 INJECTION, SUSPENSION INTRA-ARTICULAR; INTRAMUSCULAR
Status: COMPLETED | OUTPATIENT
Start: 2019-10-17 | End: 2019-10-17

## 2019-10-18 RX ORDER — LIDOCAINE HYDROCHLORIDE 10 MG/ML
1 INJECTION, SOLUTION EPIDURAL; INFILTRATION; INTRACAUDAL; PERINEURAL
Status: COMPLETED | OUTPATIENT
Start: 2019-10-17 | End: 2019-10-17

## 2019-11-11 ENCOUNTER — OFFICE VISIT (OUTPATIENT)
Dept: FAMILY MEDICINE CLINIC | Facility: CLINIC | Age: 59
End: 2019-11-11

## 2019-11-11 VITALS
WEIGHT: 208 LBS | HEIGHT: 72 IN | BODY MASS INDEX: 28.17 KG/M2 | SYSTOLIC BLOOD PRESSURE: 156 MMHG | DIASTOLIC BLOOD PRESSURE: 86 MMHG | OXYGEN SATURATION: 98 % | TEMPERATURE: 97.6 F | HEART RATE: 80 BPM

## 2019-11-11 DIAGNOSIS — F32.9 REACTIVE DEPRESSION: Primary | ICD-10-CM

## 2019-11-11 PROCEDURE — 99213 OFFICE O/P EST LOW 20 MIN: CPT | Performed by: FAMILY MEDICINE

## 2019-11-11 RX ORDER — FLUOXETINE HYDROCHLORIDE 40 MG/1
40 CAPSULE ORAL DAILY
Qty: 30 CAPSULE | Refills: 5 | Status: SHIPPED | OUTPATIENT
Start: 2019-11-11 | End: 2020-12-14

## 2019-11-11 NOTE — PROGRESS NOTES
"Denisse Pierre is a 58 y.o. male.     Pt is here for routine fu.    Pt would like to discuss worsening depression since last visit. Was started on bupropion on 9/23.    Depression   Visit Type: follow-up  Patient presents with the following symptoms: anhedonia, decreased concentration, depressed mood, excessive worry, fatigue, irritability, malaise and muscle tension.  Patient is not experiencing: chest pain, choking sensation, compulsions, confusion, dizziness, dry mouth, feelings of hopelessness, feelings of worthlessness, hypersomnia, hyperventilation, impotence, insomnia, memory impairment, nausea, nervousness/anxiety, shortness of breath, suicidal ideas, suicidal planning, thoughts of death, weight gain and weight loss.  Frequency of symptoms: most days   Severity: mild   Sleep per night: 7 hours  Sleep quality: good  Nighttime awakenings: occasional  Compliance with medications:  %             The following portions of the patient's history were reviewed and updated as appropriate: allergies, current medications, past family history, past medical history, past social history, past surgical history and problem list.    Review of Systems   Constitutional: Positive for irritability. Negative for weight gain and weight loss.   Respiratory: Negative for choking and shortness of breath.    Genitourinary: Negative for impotence.   Psychiatric/Behavioral: Positive for decreased concentration. Negative for confusion and suicidal ideas. The patient is not nervous/anxious and does not have insomnia.        Objective     Vitals:    11/11/19 0835   BP: 156/86   Pulse: 80   Temp: 97.6 °F (36.4 °C)   SpO2: 98%   Weight: 94.3 kg (208 lb)   Height: 182.9 cm (72\")       Physical Exam   Constitutional: He appears well-developed and well-nourished.   Neck: No thyromegaly present.   Cardiovascular: Normal rate and regular rhythm. Exam reveals no friction rub.   No murmur heard.  Pulmonary/Chest: Effort " normal and breath sounds normal. No stridor. No respiratory distress.   Abdominal: Soft. Bowel sounds are normal. He exhibits no distension. There is no tenderness. There is no guarding.   Lymphadenopathy:     He has no cervical adenopathy.   Skin: No rash noted. No erythema.   Psychiatric: He has a normal mood and affect. His behavior is normal. Judgment and thought content normal.   Nursing note and vitals reviewed.      Assessment/Plan     Problem List Items Addressed This Visit        Other    Reactive depression - Primary    Relevant Medications    FLUoxetine (PROZAC) 40 MG capsule    Other Relevant Orders    Ambulatory Referral to Psychology        Dc wellbutrin and start prozac.    Refer psych.  Notify office if sx worsen or are not improved.   Fu 6-8 weeks

## 2019-11-27 ENCOUNTER — CLINICAL SUPPORT NO REQUIREMENTS (OUTPATIENT)
Dept: CARDIOLOGY | Facility: CLINIC | Age: 59
End: 2019-11-27

## 2019-11-27 DIAGNOSIS — I42.8 NICM (NONISCHEMIC CARDIOMYOPATHY) (HCC): ICD-10-CM

## 2019-11-27 PROCEDURE — 93296 REM INTERROG EVL PM/IDS: CPT | Performed by: INTERNAL MEDICINE

## 2019-11-27 PROCEDURE — 93295 DEV INTERROG REMOTE 1/2/MLT: CPT | Performed by: INTERNAL MEDICINE

## 2019-12-05 ENCOUNTER — TELEPHONE (OUTPATIENT)
Dept: ORTHOPEDIC SURGERY | Facility: CLINIC | Age: 59
End: 2019-12-05

## 2020-01-13 ENCOUNTER — OFFICE VISIT (OUTPATIENT)
Dept: ENDOCRINOLOGY | Age: 60
End: 2020-01-13

## 2020-01-13 VITALS
HEIGHT: 72 IN | OXYGEN SATURATION: 97 % | SYSTOLIC BLOOD PRESSURE: 136 MMHG | DIASTOLIC BLOOD PRESSURE: 80 MMHG | WEIGHT: 211 LBS | HEART RATE: 69 BPM | BODY MASS INDEX: 28.58 KG/M2

## 2020-01-13 DIAGNOSIS — E29.1 HYPOGONADISM, TESTICULAR: ICD-10-CM

## 2020-01-13 DIAGNOSIS — IMO0002 UNCONTROLLED TYPE 2 DIABETES MELLITUS WITH COMPLICATION, WITHOUT LONG-TERM CURRENT USE OF INSULIN: Primary | ICD-10-CM

## 2020-01-13 DIAGNOSIS — IMO0002 UNCONTROLLED TYPE 2 DIABETES MELLITUS WITH COMPLICATION, WITHOUT LONG-TERM CURRENT USE OF INSULIN: ICD-10-CM

## 2020-01-13 DIAGNOSIS — E78.2 MIXED HYPERLIPIDEMIA: ICD-10-CM

## 2020-01-13 PROCEDURE — 99215 OFFICE O/P EST HI 40 MIN: CPT | Performed by: INTERNAL MEDICINE

## 2020-01-13 RX ORDER — ALPRAZOLAM 0.25 MG/1
0.25 TABLET ORAL 2 TIMES DAILY PRN
COMMUNITY
End: 2020-12-14

## 2020-01-13 NOTE — PROGRESS NOTES
59 y.o.    Patient Care Team:  Ciara Le MD as PCP - General (Family Medicine)    Chief Complaint:    Follow up / hypogonadism type 2 diabetes mellitus  Subjective     HPI    59-year-old white male is here as a follow-up for the evaluation of type 2 diabetes mellitus, hypothyroidism and abnormal testosterone levels.  Patient was last seen by me in 2018 for the evaluation of low testosterone levels, after the initial visit patient failed to follow-up with me.  Patient reports that he moved out of [place] and has come back since then.    Type 2 diabetes mellitus-diagnosed in the last 1 year.  He has been on metformin thousand milligrams oral daily with breakfast, Jardiance 25 mg oral daily.  He is tolerating the medications with no side effects.  Checking his blood sugars once or twice a week.  Average blood sugars are around 120-190.  No history of diabetic retinopathy that the patient knows of.  Denied complains of diabetic neuropathy.    Hypothyroidism  Not on any thyroid medication.  But has been told by his primary that he does have Hashimoto's thyroiditis.  Given his symptoms of chronic fatigue he is concerned if he has underactive thyroid since his daughter has the same.    Abnormal testosterone levels  Patient reports that almost since February 2019 he has not been on any testosterone replacement.  Review of labs from May 2019 showed stable testosterone levels.  Patient does have a defibrillator given his remote history of peritonitis that he sustained.  Reviewed primary care physician's/consulting physician documentation and lab results :     Interval History      The following portions of the patient's history were reviewed and updated as appropriate: allergies, current medications, past family history, past medical history, past social history, past surgical history and problem list.    History reviewed. No pertinent past medical history.  Family History   Problem Relation Age of Onset   • Cancer  "Mother    • No Known Problems Father      Social History     Socioeconomic History   • Marital status:      Spouse name: Not on file   • Number of children: Not on file   • Years of education: Not on file   • Highest education level: Not on file   Tobacco Use   • Smoking status: Current Every Day Smoker     Types: Electronic Cigarette   • Smokeless tobacco: Never Used   Substance and Sexual Activity   • Alcohol use: Yes     Comment: seldom   • Drug use: No   • Sexual activity: Defer     Allergies   Allergen Reactions   • Percocet [Oxycodone-Acetaminophen] Itching       Current Outpatient Medications:   •  ALPRAZolam (XANAX) 0.25 MG tablet, Take 0.25 mg by mouth 2 (Two) Times a Day As Needed for Anxiety., Disp: , Rfl:   •  amLODIPine (NORVASC) 5 MG tablet, Take 1 tablet by mouth Daily., Disp: 90 tablet, Rfl: 1  •  aspirin 81 MG chewable tablet, Chew 1 tablet Daily., Disp: , Rfl: 11  •  atorvastatin (LIPITOR) 10 MG tablet, Take 1 tablet by mouth Daily., Disp: 90 tablet, Rfl: 1  •  bisoprolol (ZEBeta) 5 MG tablet, Take 1 tablet by mouth Daily., Disp: 90 tablet, Rfl: 1  •  Empagliflozin 25 MG tablet, Take 25 mg by mouth Daily., Disp: 90 tablet, Rfl: 1  •  FLUoxetine (PROZAC) 40 MG capsule, Take 1 capsule by mouth Daily., Disp: 30 capsule, Rfl: 5  •  glucose blood test strip, Use as instructed, Disp: 100 each, Rfl: 12  •  glucose monitoring kit (FREESTYLE) monitoring kit, 1 each Daily., Disp: 1 each, Rfl: 0  •  Lancets 28G misc, 1 each Daily., Disp: 100 each, Rfl: 0  •  metFORMIN ER (GLUCOPHAGE-XR) 500 MG 24 hr tablet, Take 2 tablets by mouth Daily With Breakfast., Disp: 180 tablet, Rfl: 1  •  nitroglycerin (NITROSTAT) 0.6 MG SL tablet, Place 1 tablet under the tongue Every 5 (Five) Minutes As Needed for Chest Pain. Take no more than 3 doses in 15 minutes., Disp: 25 tablet, Rfl: 5  •  Syringe 22G X 1\" 3 ML misc, Use with testosterone injections, Disp: 8 each, Rfl: 11  •  Syringe/Needle, Disp, 18G X 1\" 1 ML misc, " "Use with testosterone injection, Disp: 8 each, Rfl: 11  •  sodium-potassium-magnesium sulfates (SUPREP BOWEL PREP KIT) 17.5-3.13-1.6 GM/177ML solution oral solution, Take 2 bottles by mouth Take As Directed. Do not eat the day before your procedure. If you didn't receive instructions call [phone]., Disp: 354 mL, Rfl: 0        Review of Systems   Constitutional: Positive for appetite change (decrease) and fatigue. Negative for fever.   Eyes: Negative for visual disturbance.   Respiratory: Negative for shortness of breath.    Cardiovascular: Negative for palpitations and leg swelling.   Gastrointestinal: Negative for abdominal pain and vomiting.   Endocrine: Positive for polydipsia. Negative for polyuria.   Musculoskeletal: Negative for joint swelling and neck pain.   Skin: Negative for rash.   Neurological: Negative for weakness and numbness.   Psychiatric/Behavioral: Negative for behavioral problems.     I have reviewed the ROS as documented by the MA; Eliseo Chin MD.      Objective       Vitals:    01/13/20 1401   BP: 136/80   Pulse: 69   SpO2: 97%   Weight: 95.7 kg (211 lb)   Height: 182.9 cm (72\")     Body mass index is 28.62 kg/m².      Physical Exam   Constitutional: He is oriented to person, place, and time. He appears well-nourished.   obese   HENT:   Head: Normocephalic and atraumatic.   Wide neck   Eyes: Conjunctivae and EOM are normal.   Neck: Normal range of motion. Neck supple. Carotid bruit is not present. No thyromegaly present.   Acanthosis nigricans   Cardiovascular: Normal rate and normal heart sounds.   Pulmonary/Chest: Effort normal and breath sounds normal. No stridor. No respiratory distress.   Abdominal: Soft. Bowel sounds are normal. He exhibits no distension. There is no tenderness.   Central obesity   Musculoskeletal: He exhibits no edema or tenderness.   Neurological: He is alert and oriented to person, place, and time.   Skin: Skin is warm and dry.   Psychiatric: He has a normal " mood and affect. His behavior is normal.   Vitals reviewed.    Results Review:     I reviewed the patient's new clinical results and mentioned them above in HPI and in plan as well.    Medical records reviewed  Summary:Done      Admission on 08/02/2019, Discharged on 08/02/2019   Component Date Value Ref Range Status   • WBC 08/02/2019 6.96  3.40 - 10.80 10*3/mm3 Final   • RBC 08/02/2019 5.24  4.14 - 5.80 10*6/mm3 Final   • Hemoglobin 08/02/2019 16.2  13.0 - 17.7 g/dL Final   • Hematocrit 08/02/2019 47.1  37.5 - 51.0 % Final   • MCV 08/02/2019 89.9  79.0 - 97.0 fL Final   • MCH 08/02/2019 30.9  26.6 - 33.0 pg Final   • MCHC 08/02/2019 34.4  31.5 - 35.7 g/dL Final   • RDW 08/02/2019 12.3  12.3 - 15.4 % Final   • RDW-SD 08/02/2019 40.4  37.0 - 54.0 fl Final   • MPV 08/02/2019 10.1  6.0 - 12.0 fL Final   • Platelets 08/02/2019 161  140 - 450 10*3/mm3 Final   • Glucose 08/02/2019 147* 65 - 99 mg/dL Final   • BUN 08/02/2019 15  6 - 20 mg/dL Final   • Creatinine 08/02/2019 1.01  0.76 - 1.27 mg/dL Final   • Sodium 08/02/2019 138  136 - 145 mmol/L Final   • Potassium 08/02/2019 4.0  3.5 - 5.2 mmol/L Final   • Chloride 08/02/2019 101  98 - 107 mmol/L Final   • CO2 08/02/2019 27.0  22.0 - 29.0 mmol/L Final   • Calcium 08/02/2019 9.3  8.6 - 10.5 mg/dL Final   • Total Protein 08/02/2019 7.4  6.0 - 8.5 g/dL Final   • Albumin 08/02/2019 4.40  3.50 - 5.20 g/dL Final   • ALT (SGPT) 08/02/2019 65* 1 - 41 U/L Final   • AST (SGOT) 08/02/2019 48* 1 - 40 U/L Final    Specimen hemolyzed.  Results may be affected.   • Alkaline Phosphatase 08/02/2019 61  39 - 117 U/L Final   • Total Bilirubin 08/02/2019 0.3  0.2 - 1.2 mg/dL Final   • eGFR Non African Amer 08/02/2019 76  >60 mL/min/1.73 Final   • Globulin 08/02/2019 3.0  gm/dL Final   • A/G Ratio 08/02/2019 1.5  g/dL Final   • BUN/Creatinine Ratio 08/02/2019 14.9  7.0 - 25.0 Final   • Anion Gap 08/02/2019 10.0  5.0 - 15.0 mmol/L Final   • Total Cholesterol 08/02/2019 240* 0 - 200 mg/dL  Final   • Triglycerides 08/02/2019 304* 0 - 150 mg/dL Final   • HDL Cholesterol 08/02/2019 41  40 - 60 mg/dL Final   • LDL Cholesterol  08/02/2019 138* 0 - 100 mg/dL Final   • VLDL Cholesterol 08/02/2019 60.8  mg/dL Final   • LDL/HDL Ratio 08/02/2019 3.37   Final   • Hemoglobin A1C 08/02/2019 8.30* 4.80 - 5.60 % Final   • Glucose 08/02/2019 139* 70 - 130 mg/dL Final     Lab Results   Component Value Date    HGBA1C 8.30 (H) 08/02/2019    HGBA1C 7.30 (H) 05/06/2019    HGBA1C 6.81 (H) 02/15/2018     Lab Results   Component Value Date    CREATININE 1.01 08/02/2019     Imaging Results (Most Recent)     None                Assessment and Plan:    Favian was seen today for hypogonadism.    Diagnoses and all orders for this visit:    Uncontrolled type 2 diabetes mellitus with complication, without long-term current use of insulin (CMS/[facility])  -     Hemoglobin A1c  -     Basic Metabolic Panel  -     TSH  -     T4, Free  -     Cancel: Hemoglobin A1c; Future  -     Cancel: Basic Metabolic Panel; Future  -     Cancel: Lipid Panel; Future  -     Cancel: TSH; Future  -     Cancel: Microalbumin / Creatinine Urine Ratio - Urine, Clean Catch; Future  -     Cancel: T4, Free; Future  -     Cancel: TestT+TestF+SHBG; Future  -     Cancel: Vitamin D 25 Hydroxy; Future  -     Cancel: Vitamin B12 & Folate; Future  -     Cancel: Hemoglobin & Hematocrit, Blood; Future  -     TestT+TestF+SHBG  -     Cancel: PSA DIAGNOSTIC; Future  -     Hemoglobin & Hematocrit, Blood  -     Hemoglobin A1c; Future  -     Basic Metabolic Panel; Future  -     Lipid Panel; Future  -     TSH; Future  -     Microalbumin / Creatinine Urine Ratio - Urine, Clean Catch; Future  -     T4, Free; Future  -     TestT+TestF+SHBG; Future  -     Vitamin D 25 Hydroxy; Future  -     Vitamin B12 & Folate; Future  -     Hemoglobin & Hematocrit, Blood; Future  -     PSA DIAGNOSTIC; Future    Mixed hyperlipidemia  -     Hemoglobin A1c  -     Basic Metabolic Panel  -     TSH  -      T4, Free  -     Cancel: Hemoglobin A1c; Future  -     Cancel: Basic Metabolic Panel; Future  -     Cancel: Lipid Panel; Future  -     Cancel: TSH; Future  -     Cancel: Microalbumin / Creatinine Urine Ratio - Urine, Clean Catch; Future  -     Cancel: T4, Free; Future  -     Cancel: TestT+TestF+SHBG; Future  -     Cancel: Vitamin D 25 Hydroxy; Future  -     Cancel: Vitamin B12 & Folate; Future  -     Cancel: Hemoglobin & Hematocrit, Blood; Future  -     TestT+TestF+SHBG  -     Cancel: PSA DIAGNOSTIC; Future  -     Hemoglobin & Hematocrit, Blood  -     Hemoglobin A1c; Future  -     Basic Metabolic Panel; Future  -     Lipid Panel; Future  -     TSH; Future  -     Microalbumin / Creatinine Urine Ratio - Urine, Clean Catch; Future  -     T4, Free; Future  -     TestT+TestF+SHBG; Future  -     Vitamin D 25 Hydroxy; Future  -     Vitamin B12 & Folate; Future  -     Hemoglobin & Hematocrit, Blood; Future  -     PSA DIAGNOSTIC; Future    Hypogonadism, testicular  -     Hemoglobin A1c  -     Basic Metabolic Panel  -     TSH  -     T4, Free  -     Cancel: Hemoglobin A1c; Future  -     Cancel: Basic Metabolic Panel; Future  -     Cancel: Lipid Panel; Future  -     Cancel: TSH; Future  -     Cancel: Microalbumin / Creatinine Urine Ratio - Urine, Clean Catch; Future  -     Cancel: T4, Free; Future  -     Cancel: TestT+TestF+SHBG; Future  -     Cancel: Vitamin D 25 Hydroxy; Future  -     Cancel: Vitamin B12 & Folate; Future  -     Cancel: Hemoglobin & Hematocrit, Blood; Future  -     TestT+TestF+SHBG  -     Cancel: PSA DIAGNOSTIC; Future  -     Hemoglobin & Hematocrit, Blood  -     Hemoglobin A1c; Future  -     Basic Metabolic Panel; Future  -     Lipid Panel; Future  -     TSH; Future  -     Microalbumin / Creatinine Urine Ratio - Urine, Clean Catch; Future  -     T4, Free; Future  -     TestT+TestF+SHBG; Future  -     Vitamin D 25 Hydroxy; Future  -     Vitamin B12 & Folate; Future  -     Hemoglobin & Hematocrit, Blood;  "Future  -     PSA DIAGNOSTIC; Future      Type 2 diabetes mellitus-uncontrolled  Newly diagnosed since his last visit with me  Check HbA1c  Adjust medications based on his blood work-up    Hypothyroidism  Check lipid panel.    Hypogonadism-symptoms worse  Check testosterone panel  PSA  Hemoglobin and hematocrit levels.  Emphasized to the patient that given his diabetes, age and also his history of peritonitis-defibrillator he would be at a higher risk for cardiac issues, stroke etc.    Reviewed Lab results with the patient.             Eliseo Chin MD  01/13/20    EMR Dragon / transcription disclaimer:     \"Dictated utilizing Dragon dictation\".  "

## 2020-01-15 LAB
BUN SERPL-MCNC: 14 MG/DL (ref 6–24)
BUN/CREAT SERPL: 12 (ref 9–20)
CALCIUM SERPL-MCNC: 9.6 MG/DL (ref 8.7–10.2)
CHLORIDE SERPL-SCNC: 104 MMOL/L (ref 96–106)
CO2 SERPL-SCNC: 24 MMOL/L (ref 20–29)
CREAT SERPL-MCNC: 1.14 MG/DL (ref 0.76–1.27)
GLUCOSE SERPL-MCNC: 170 MG/DL (ref 65–99)
HBA1C MFR BLD: 7 % (ref 4.8–5.6)
HCT VFR BLD AUTO: 46.8 % (ref 37.5–51)
HGB BLD-MCNC: 15.9 G/DL (ref 13–17.7)
POTASSIUM SERPL-SCNC: 4.9 MMOL/L (ref 3.5–5.2)
SHBG SERPL-SCNC: 15.4 NMOL/L (ref 19.3–76.4)
SODIUM SERPL-SCNC: 146 MMOL/L (ref 134–144)
T4 FREE SERPL-MCNC: 0.96 NG/DL (ref 0.82–1.77)
TESTOST FREE SERPL-MCNC: 2.5 PG/ML (ref 7.2–24)
TESTOST SERPL-MCNC: 43 NG/DL (ref 264–916)
TSH SERPL DL<=0.005 MIU/L-ACNC: 3.64 UIU/ML (ref 0.45–4.5)

## 2020-01-22 ENCOUNTER — LAB (OUTPATIENT)
Dept: LAB | Facility: HOSPITAL | Age: 60
End: 2020-01-22

## 2020-01-22 DIAGNOSIS — E78.5 HYPERLIPIDEMIA LDL GOAL <100: Primary | ICD-10-CM

## 2020-01-22 LAB
25(OH)D3 SERPL-MCNC: 17.6 NG/ML (ref 30–100)
ALBUMIN UR-MCNC: <1.2 MG/DL
ANION GAP SERPL CALCULATED.3IONS-SCNC: 12.4 MMOL/L (ref 5–15)
BUN BLD-MCNC: 12 MG/DL (ref 6–20)
BUN/CREAT SERPL: 14.6 (ref 7–25)
CALCIUM SPEC-SCNC: 9.8 MG/DL (ref 8.6–10.5)
CHLORIDE SERPL-SCNC: 99 MMOL/L (ref 98–107)
CHOLEST SERPL-MCNC: 218 MG/DL (ref 0–200)
CO2 SERPL-SCNC: 26.6 MMOL/L (ref 22–29)
CREAT BLD-MCNC: 0.82 MG/DL (ref 0.76–1.27)
FOLATE SERPL-MCNC: 13.5 NG/ML (ref 4.78–24.2)
GFR SERPL CREATININE-BSD FRML MDRD: 96 ML/MIN/1.73
GLUCOSE BLD-MCNC: 152 MG/DL (ref 65–99)
HBA1C MFR BLD: 6.8 % (ref 4.8–5.6)
HCT VFR BLD AUTO: 45.1 % (ref 37.5–51)
HDLC SERPL-MCNC: 40 MG/DL (ref 40–60)
HGB BLD-MCNC: 15.9 G/DL (ref 13–17.7)
LDLC SERPL CALC-MCNC: 118 MG/DL (ref 0–100)
LDLC/HDLC SERPL: 2.96 {RATIO}
POTASSIUM BLD-SCNC: 4.6 MMOL/L (ref 3.5–5.2)
PSA SERPL-MCNC: 0.05 NG/ML (ref 0–4)
SODIUM BLD-SCNC: 138 MMOL/L (ref 136–145)
T4 FREE SERPL-MCNC: 1.04 NG/DL (ref 0.93–1.7)
TRIGL SERPL-MCNC: 298 MG/DL (ref 0–150)
TSH SERPL DL<=0.05 MIU/L-ACNC: 7.41 UIU/ML (ref 0.27–4.2)
VIT B12 BLD-MCNC: 614 PG/ML (ref 211–946)
VLDLC SERPL-MCNC: 59.6 MG/DL (ref 5–40)

## 2020-01-22 PROCEDURE — 80048 BASIC METABOLIC PNL TOTAL CA: CPT

## 2020-01-22 PROCEDURE — 84153 ASSAY OF PSA TOTAL: CPT

## 2020-01-22 PROCEDURE — 82306 VITAMIN D 25 HYDROXY: CPT

## 2020-01-22 PROCEDURE — 82043 UR ALBUMIN QUANTITATIVE: CPT

## 2020-01-22 PROCEDURE — 80061 LIPID PANEL: CPT

## 2020-01-22 PROCEDURE — 84270 ASSAY OF SEX HORMONE GLOBUL: CPT

## 2020-01-22 PROCEDURE — 82607 VITAMIN B-12: CPT

## 2020-01-22 PROCEDURE — 36415 COLL VENOUS BLD VENIPUNCTURE: CPT

## 2020-01-22 PROCEDURE — 83036 HEMOGLOBIN GLYCOSYLATED A1C: CPT

## 2020-01-22 PROCEDURE — 85014 HEMATOCRIT: CPT

## 2020-01-22 PROCEDURE — 84403 ASSAY OF TOTAL TESTOSTERONE: CPT

## 2020-01-22 PROCEDURE — 84443 ASSAY THYROID STIM HORMONE: CPT

## 2020-01-22 PROCEDURE — 82746 ASSAY OF FOLIC ACID SERUM: CPT

## 2020-01-22 PROCEDURE — 84402 ASSAY OF FREE TESTOSTERONE: CPT

## 2020-01-22 PROCEDURE — 84439 ASSAY OF FREE THYROXINE: CPT

## 2020-01-22 PROCEDURE — 85018 HEMOGLOBIN: CPT

## 2020-01-24 LAB
SHBG SERPL-SCNC: 15.9 NMOL/L (ref 19.3–76.4)
TESTOST FREE SERPL-MCNC: 2.1 PG/ML (ref 7.2–24)
TESTOST SERPL-MCNC: 42 NG/DL (ref 264–916)

## 2020-01-26 LAB — TESTOST SERPL-MCNC: 59.6 NG/DL (ref 264–916)

## 2020-01-30 DIAGNOSIS — E23.0 HYPOGONADOTROPIC HYPOGONADISM (HCC): Primary | ICD-10-CM

## 2020-01-30 RX ORDER — TESTOSTERONE CYPIONATE 100 MG/ML
1 VIAL (ML) INTRAMUSCULAR
Qty: 2 ML | Refills: 2 | Status: SHIPPED | OUTPATIENT
Start: 2020-01-30 | End: 2020-12-14

## 2020-01-30 RX ORDER — LEVOTHYROXINE SODIUM 0.07 MG/1
75 TABLET ORAL DAILY
Qty: 30 TABLET | Refills: 11 | Status: SHIPPED | OUTPATIENT
Start: 2020-01-30 | End: 2022-07-01

## 2020-01-30 RX ORDER — SYRINGE W-NEEDLE,DISPOSAB,3 ML 25GX5/8"
SYRINGE, EMPTY DISPOSABLE MISCELLANEOUS
Qty: 12 EACH | Refills: 0 | Status: SHIPPED | OUTPATIENT
Start: 2020-01-30

## 2020-02-24 RX ORDER — NITROGLYCERIN 0.6 MG/1
0.6 TABLET SUBLINGUAL
Qty: 25 TABLET | Refills: 5 | Status: SHIPPED | OUTPATIENT
Start: 2020-02-24 | End: 2020-06-19

## 2020-02-26 ENCOUNTER — CLINICAL SUPPORT NO REQUIREMENTS (OUTPATIENT)
Dept: CARDIOLOGY | Facility: CLINIC | Age: 60
End: 2020-02-26

## 2020-02-26 DIAGNOSIS — I50.31 ACUTE DIASTOLIC CHF (CONGESTIVE HEART FAILURE) (HCC): ICD-10-CM

## 2020-02-26 PROCEDURE — 93295 DEV INTERROG REMOTE 1/2/MLT: CPT | Performed by: INTERNAL MEDICINE

## 2020-02-26 PROCEDURE — 93296 REM INTERROG EVL PM/IDS: CPT | Performed by: INTERNAL MEDICINE

## 2020-02-27 ENCOUNTER — OFFICE VISIT (OUTPATIENT)
Dept: ORTHOPEDIC SURGERY | Facility: CLINIC | Age: 60
End: 2020-02-27

## 2020-02-27 VITALS — OXYGEN SATURATION: 97 % | HEIGHT: 72 IN | HEART RATE: 84 BPM | WEIGHT: 210.98 LBS | BODY MASS INDEX: 28.58 KG/M2

## 2020-02-27 DIAGNOSIS — M77.12 LATERAL EPICONDYLITIS OF LEFT ELBOW: Primary | ICD-10-CM

## 2020-02-27 PROCEDURE — 20551 NJX 1 TENDON ORIGIN/INSJ: CPT | Performed by: PHYSICIAN ASSISTANT

## 2020-02-27 PROCEDURE — 99214 OFFICE O/P EST MOD 30 MIN: CPT | Performed by: PHYSICIAN ASSISTANT

## 2020-02-27 RX ADMIN — LIDOCAINE HYDROCHLORIDE 2 ML: 10 INJECTION, SOLUTION EPIDURAL; INFILTRATION; INTRACAUDAL; PERINEURAL at 15:41

## 2020-02-27 RX ADMIN — METHYLPREDNISOLONE ACETATE 40 MG: 40 INJECTION, SUSPENSION INTRA-ARTICULAR; INTRALESIONAL; INTRAMUSCULAR; SOFT TISSUE at 15:41

## 2020-02-27 NOTE — PROGRESS NOTES
Procedure   Medium Joint Arthrocentesis  Date/Time: 2/27/2020 3:41 PM  Consent given by: patient  Site marked: site marked  Timeout: Immediately prior to procedure a time out was called to verify the correct patient, procedure, equipment, support staff and site/side marked as required   Supporting Documentation  Indications: pain   Procedure Details  Location: elbow -   Preparation: Patient was prepped and draped in the usual sterile fashion  Needle size: 25 G (short)  Approach: anterolateral  Medications administered: 2 mL lidocaine PF 1% 1 %; 40 mg methylPREDNISolone acetate 40 MG/ML  Patient tolerance: patient tolerated the procedure well with no immediate complications

## 2020-02-27 NOTE — PROGRESS NOTES
"        [facility] Note    Subjective     Patient: Favian Pierre  : 1960    Primary Care Provider: Ciara Le MD    Requesting Provider: As above    Follow-up of the Left Elbow (4 months)      History    Chief Complaint: Left elbow pain    History of Present Illness: This is a very pleasant patient of Christine Ham, new to me, turning today with return left lateral elbow pain for 3 weeks.  He was treated by Christine for lateral epicondylitis in 2019 with injection which resolved his pain.  He did not do physical therapy because he felt so good.  He is right-hand dominant.  He complains of the exact same pain he had at that time with pain 5/10 with palm down lifting.  He is here for further evaluation and treatment recommendations.  Current Outpatient Medications on File Prior to Visit   Medication Sig Dispense Refill   • ALPRAZolam (XANAX) 0.25 MG tablet Take 0.25 mg by mouth 2 (Two) Times a Day As Needed for Anxiety.     • amLODIPine (NORVASC) 5 MG tablet Take 1 tablet by mouth Daily. 90 tablet 1   • aspirin 81 MG chewable tablet Chew 1 tablet Daily.  11   • atorvastatin (LIPITOR) 10 MG tablet Take 1 tablet by mouth Daily. 90 tablet 1   • bisoprolol (ZEBeta) 5 MG tablet Take 1 tablet by mouth Daily. 90 tablet 1   • Empagliflozin 25 MG tablet Take 25 mg by mouth Daily. 90 tablet 1   • FLUoxetine (PROZAC) 40 MG capsule Take 1 capsule by mouth Daily. 30 capsule 5   • glucose blood test strip Use as instructed 100 each 12   • glucose monitoring kit (FREESTYLE) monitoring kit 1 each Daily. 1 each 0   • Lancets 28G misc 1 each Daily. 100 each 0   • levothyroxine (SYNTHROID) 75 MCG tablet Take 1 tablet by mouth Daily. 30 tablet 11   • metFORMIN ER (GLUCOPHAGE-XR) 500 MG 24 hr tablet Take 2 tablets by mouth Daily With Breakfast. 180 tablet 1   • Needle, Disp, (BD SAFETYGLIDE NEEDLE) 18G X 1-1/2\" misc Use to pull medication 12 each 0   • nitroglycerin " "(NITROSTAT) 0.6 MG SL tablet Place 1 tablet under the tongue Every 5 (Five) Minutes As Needed for Chest Pain. Take no more than 3 doses in 15 minutes. 25 tablet 5   • sodium-potassium-magnesium sulfates (SUPREP BOWEL PREP KIT) 17.5-3.13-1.6 GM/177ML solution oral solution Take 2 bottles by mouth Take As Directed. Do not eat the day before your procedure. If you didn't receive instructions call [phone]. 354 mL 0   • Syringe 22G X 1\" 3 ML misc Use with testosterone injections 8 each 11   • Syringe 22G X 1\" 3 ML misc Use to inject medication every 2 weeks 12 each 0   • Syringe/Needle, Disp, 18G X 1\" 1 ML misc Use with testosterone injection 8 each 11   • Testosterone Cypionate 100 MG/ML solution Inject 1 mL into the appropriate muscle as directed by prescriber Every 14 (Fourteen) Days. 2 mL 2     No current facility-administered medications on file prior to visit.       Allergies   Allergen Reactions   • Percocet [Oxycodone-Acetaminophen] Itching      History reviewed. No pertinent past medical history.  Past Surgical History:   Procedure Laterality Date   • APPENDECTOMY     • CARDIAC CATHETERIZATION N/A 8/2/2019    Procedure: Left Heart Cath;  Surgeon: Jalil Guillen MD;  Location: [facility] CATH INVASIVE LOCATION;  Service: Cardiovascular   • CARDIAC DEFIBRILLATOR PLACEMENT     • FIBULA TIBIA BONE GRAFT     • HERNIA REPAIR     • LUMBAR FUSION      4-6     Family History   Problem Relation Age of Onset   • Cancer Mother    • No Known Problems Father       Social History     Socioeconomic History   • Marital status:      Spouse name: Not on file   • Number of children: Not on file   • Years of education: Not on file   • Highest education level: Not on file   Tobacco Use   • Smoking status: Current Every Day Smoker     Types: Electronic Cigarette   • Smokeless tobacco: Never Used   Substance and Sexual Activity   • Alcohol use: Yes     Comment: seldom   • Drug use: No   • Sexual activity: Defer    " "    Review of Systems   Constitutional: Negative.    HENT: Negative.    Eyes: Negative.    Respiratory: Negative.    Cardiovascular: Negative.    Gastrointestinal: Negative.    Endocrine: Negative.    Genitourinary: Negative.    Musculoskeletal: Positive for arthralgias.   Skin: Negative.    Allergic/Immunologic: Negative.    Neurological: Negative.    Hematological: Negative.    Psychiatric/Behavioral: Negative.        The following portions of the patient's history were reviewed and updated as appropriate: allergies, current medications, past family history, past medical history, past social history, past surgical history and problem list.      Objective      Physical Exam  Pulse 84   Ht 182.9 cm (72.01\")   Wt 95.7 kg (210 lb 15.7 oz)   SpO2 97%   BMI 28.61 kg/m²     Body mass index is 28.61 kg/m².    Patient is well developed, well nourished and in no acute distress.  Alert and oriented x 3.    Ortho Exam  Peripheral Vascular   Left Upper Extremity    No cyanotic nail beds    Pink nail beds and rapid capillary refill   Palpation    Radial Pulse - Bilaterally normal    Neurologic   Sensory - Elbow   Inspection and Palpation:    Light touch: intact - right hand   Muscle Strength and Tone:    Left bicep - 5/5    Left tricep - 5/5    Left wrist extensors - 5/5     Left wrist flexors - 5/5    Left intrinsics - 5/5    Musculoskeletal   Left Upper Extremity - Elbow   Inspection and Palpation     Tenderness -over the lateral epicondyle with pain with resisted wrist flexion and forearm supination    Effusion - none    Crepitus - none   Range of Motion    Flexion: AROM - 140 degrees    Extension - AROM - 0 degrees     Forearm supination: AROM - 90 degrees    Forearm pronation - AROM - 90 degrees    Deformities/Malalignments/Discepancies - none   Functional testing:    Valgus stress test negative    Varus stress test negative    Tinel's sign at Cubital tunnel negative        Medical Decision Making    Data Review: "   none    Assessment:  1. Lateral epicondylitis of left elbow        Plan:  Left lateral epicondylitis.  I reviewed clinical findings past and current treatment the patient.  I think his return pain is secondary to lateral epicondylitis.  We discussed the importance of physical therapy in the treatment of lateral epicondylitis to resolve the pain.  Recommendation today is steroid injection at the lateral epicondyle.  I have given him prescription to do physical therapy at [facility].  He will return to see us as needed.    Please see procedure note for details      Jesusita Gregorio PA-C  02/27/20  3:40 PM

## 2020-02-28 RX ORDER — METHYLPREDNISOLONE ACETATE 40 MG/ML
40 INJECTION, SUSPENSION INTRA-ARTICULAR; INTRALESIONAL; INTRAMUSCULAR; SOFT TISSUE
Status: COMPLETED | OUTPATIENT
Start: 2020-02-27 | End: 2020-02-27

## 2020-02-28 RX ORDER — LIDOCAINE HYDROCHLORIDE 10 MG/ML
2 INJECTION, SOLUTION EPIDURAL; INFILTRATION; INTRACAUDAL; PERINEURAL
Status: COMPLETED | OUTPATIENT
Start: 2020-02-27 | End: 2020-02-27

## 2020-04-12 ENCOUNTER — RESULTS ENCOUNTER (OUTPATIENT)
Dept: ENDOCRINOLOGY | Age: 60
End: 2020-04-12

## 2020-04-12 DIAGNOSIS — E78.2 MIXED HYPERLIPIDEMIA: ICD-10-CM

## 2020-04-12 DIAGNOSIS — IMO0002 UNCONTROLLED TYPE 2 DIABETES MELLITUS WITH COMPLICATION, WITHOUT LONG-TERM CURRENT USE OF INSULIN: ICD-10-CM

## 2020-04-12 DIAGNOSIS — E29.1 HYPOGONADISM, TESTICULAR: ICD-10-CM

## 2020-04-13 ENCOUNTER — PATIENT MESSAGE (OUTPATIENT)
Dept: CARDIOLOGY | Facility: CLINIC | Age: 60
End: 2020-04-13

## 2020-04-17 NOTE — TELEPHONE ENCOUNTER
Please make sure we have his previous cardiology records to verify when and why he got the device and what his EF was around that time.

## 2020-05-04 RX ORDER — BISOPROLOL FUMARATE 5 MG/1
TABLET, FILM COATED ORAL
Qty: 90 TABLET | Refills: 3 | Status: SHIPPED | OUTPATIENT
Start: 2020-05-04

## 2020-05-15 RX ORDER — ATORVASTATIN CALCIUM 10 MG/1
TABLET, FILM COATED ORAL
Qty: 90 TABLET | Refills: 1 | Status: SHIPPED | OUTPATIENT
Start: 2020-05-15

## 2020-06-19 DIAGNOSIS — E11.9 TYPE 2 DIABETES MELLITUS WITHOUT COMPLICATION, WITHOUT LONG-TERM CURRENT USE OF INSULIN (HCC): ICD-10-CM

## 2020-06-19 DIAGNOSIS — I10 ESSENTIAL HYPERTENSION: ICD-10-CM

## 2020-06-19 RX ORDER — NITROGLYCERIN 0.6 MG/1
TABLET SUBLINGUAL
Qty: 100 TABLET | Refills: 3 | Status: SHIPPED | OUTPATIENT
Start: 2020-06-19

## 2020-06-19 RX ORDER — EMPAGLIFLOZIN 25 MG/1
TABLET, FILM COATED ORAL
Qty: 90 TABLET | Refills: 0 | Status: SHIPPED | OUTPATIENT
Start: 2020-06-19

## 2020-06-19 RX ORDER — AMLODIPINE BESYLATE 5 MG/1
TABLET ORAL
Qty: 90 TABLET | Refills: 0 | Status: SHIPPED | OUTPATIENT
Start: 2020-06-19

## 2020-06-19 RX ORDER — METFORMIN HYDROCHLORIDE 500 MG/1
TABLET, EXTENDED RELEASE ORAL
Qty: 180 TABLET | Refills: 0 | Status: SHIPPED | OUTPATIENT
Start: 2020-06-19

## 2020-09-03 ENCOUNTER — TELEPHONE (OUTPATIENT)
Dept: CARDIOLOGY | Facility: CLINIC | Age: 60
End: 2020-09-03

## 2020-09-03 NOTE — TELEPHONE ENCOUNTER
Called pt due to [employer] home monitor didn't send in scheduled reading.  He answered and hung up on me.  Monitor is connecting.

## 2020-11-03 RX ORDER — BUPROPION HYDROCHLORIDE 150 MG/1
TABLET ORAL
Qty: 90 TABLET | Refills: 3 | Status: SHIPPED | OUTPATIENT
Start: 2020-11-03 | End: 2020-12-14

## 2020-12-01 ENCOUNTER — TELEPHONE (OUTPATIENT)
Dept: FAMILY MEDICINE CLINIC | Facility: CLINIC | Age: 60
End: 2020-12-01

## 2020-12-01 NOTE — TELEPHONE ENCOUNTER
Adore from [facility] called to request authorization from [employer] for a visit, he is scheduled with them on 12/14/20 @3:45pm.  Thank you

## 2020-12-14 ENCOUNTER — OFFICE VISIT (OUTPATIENT)
Dept: CARDIOLOGY | Facility: CLINIC | Age: 60
End: 2020-12-14

## 2020-12-14 VITALS
DIASTOLIC BLOOD PRESSURE: 66 MMHG | BODY MASS INDEX: 26.01 KG/M2 | WEIGHT: 192 LBS | OXYGEN SATURATION: 99 % | HEIGHT: 72 IN | HEART RATE: 93 BPM | SYSTOLIC BLOOD PRESSURE: 132 MMHG

## 2020-12-14 DIAGNOSIS — I50.31 ACUTE DIASTOLIC CHF (CONGESTIVE HEART FAILURE) (HCC): Primary | ICD-10-CM

## 2020-12-14 DIAGNOSIS — I42.8 NICM (NONISCHEMIC CARDIOMYOPATHY) (HCC): ICD-10-CM

## 2020-12-14 DIAGNOSIS — E78.2 MIXED HYPERLIPIDEMIA: ICD-10-CM

## 2020-12-14 DIAGNOSIS — I10 ESSENTIAL HYPERTENSION: ICD-10-CM

## 2020-12-14 PROCEDURE — 93284 PRGRMG EVAL IMPLANTABLE DFB: CPT | Performed by: INTERNAL MEDICINE

## 2020-12-14 PROCEDURE — 99214 OFFICE O/P EST MOD 30 MIN: CPT | Performed by: INTERNAL MEDICINE

## 2020-12-18 ENCOUNTER — APPOINTMENT (OUTPATIENT)
Dept: CARDIOLOGY | Facility: HOSPITAL | Age: 60
End: 2020-12-18

## 2020-12-18 ENCOUNTER — HOSPITAL ENCOUNTER (OUTPATIENT)
Dept: CARDIOLOGY | Facility: HOSPITAL | Age: 60
Discharge: HOME | End: 2020-12-18
Admitting: INTERNAL MEDICINE

## 2020-12-18 VITALS — HEIGHT: 71 IN | BODY MASS INDEX: 26.88 KG/M2 | WEIGHT: 192 LBS

## 2020-12-18 DIAGNOSIS — I50.31 ACUTE DIASTOLIC CHF (CONGESTIVE HEART FAILURE) (HCC): ICD-10-CM

## 2020-12-18 DIAGNOSIS — I42.8 NICM (NONISCHEMIC CARDIOMYOPATHY) (HCC): ICD-10-CM

## 2020-12-18 PROCEDURE — 93306 TTE W/DOPPLER COMPLETE: CPT | Performed by: INTERNAL MEDICINE

## 2020-12-18 PROCEDURE — 93306 TTE W/DOPPLER COMPLETE: CPT

## 2020-12-18 PROCEDURE — 25010000002 SULFUR HEXAFLUORIDE MICROSPH 60.7-25 MG RECONSTITUTED SUSPENSION: Performed by: INTERNAL MEDICINE

## 2020-12-18 RX ADMIN — SULFUR HEXAFLUORIDE 2 ML: KIT at 10:30

## 2020-12-21 ENCOUNTER — TELEPHONE (OUTPATIENT)
Dept: CARDIOLOGY | Facility: CLINIC | Age: 60
End: 2020-12-21

## 2020-12-21 LAB
ASCENDING AORTA: 2.7 CM
BH CV ECHO MEAS - AO MAX PG (FULL): 2.3 MMHG
BH CV ECHO MEAS - AO MAX PG: 4.6 MMHG
BH CV ECHO MEAS - AO MEAN PG (FULL): 0.87 MMHG
BH CV ECHO MEAS - AO MEAN PG: 1.9 MMHG
BH CV ECHO MEAS - AO ROOT AREA (BSA CORRECTED): 1.7
BH CV ECHO MEAS - AO ROOT AREA: 9.5 CM^2
BH CV ECHO MEAS - AO ROOT DIAM: 3.5 CM
BH CV ECHO MEAS - AO V2 MAX: 107.8 CM/SEC
BH CV ECHO MEAS - AO V2 MEAN: 64.1 CM/SEC
BH CV ECHO MEAS - AO V2 VTI: 19 CM
BH CV ECHO MEAS - ASC AORTA: 2.7 CM
BH CV ECHO MEAS - AVA(I,A): 3 CM^2
BH CV ECHO MEAS - AVA(I,D): 3 CM^2
BH CV ECHO MEAS - AVA(V,A): 2.6 CM^2
BH CV ECHO MEAS - AVA(V,D): 2.6 CM^2
BH CV ECHO MEAS - BSA(HAYCOCK): 2.1 M^2
BH CV ECHO MEAS - BSA: 2.1 M^2
BH CV ECHO MEAS - BZI_BMI: 26 KILOGRAMS/M^2
BH CV ECHO MEAS - BZI_METRIC_HEIGHT: 182.9 CM
BH CV ECHO MEAS - BZI_METRIC_WEIGHT: 87.1 KG
BH CV ECHO MEAS - EDV(CUBED): 116.4 ML
BH CV ECHO MEAS - EDV(MOD-SP2): 162 ML
BH CV ECHO MEAS - EDV(MOD-SP4): 173 ML
BH CV ECHO MEAS - EDV(TEICH): 111.9 ML
BH CV ECHO MEAS - EF(CUBED): 63.8 %
BH CV ECHO MEAS - EF(MOD-BP): 41 %
BH CV ECHO MEAS - EF(MOD-SP2): 36.4 %
BH CV ECHO MEAS - EF(MOD-SP4): 44.5 %
BH CV ECHO MEAS - EF(TEICH): 55.2 %
BH CV ECHO MEAS - ESV(CUBED): 42.1 ML
BH CV ECHO MEAS - ESV(MOD-SP2): 103 ML
BH CV ECHO MEAS - ESV(MOD-SP4): 96 ML
BH CV ECHO MEAS - ESV(TEICH): 50.2 ML
BH CV ECHO MEAS - FS: 28.7 %
BH CV ECHO MEAS - IVS/LVPW: 1.3
BH CV ECHO MEAS - IVSD: 1.4 CM
BH CV ECHO MEAS - LA DIMENSION: 3.9 CM
BH CV ECHO MEAS - LA/AO: 1.1
BH CV ECHO MEAS - LAD MAJOR: 5 CM
BH CV ECHO MEAS - LAT PEAK E' VEL: 6 CM/SEC
BH CV ECHO MEAS - LATERAL E/E' RATIO: 12.2
BH CV ECHO MEAS - LV DIASTOLIC VOL/BSA (35-75): 82.6 ML/M^2
BH CV ECHO MEAS - LV IVRT: 0.11 SEC
BH CV ECHO MEAS - LV MASS(C)D: 244 GRAMS
BH CV ECHO MEAS - LV MASS(C)DI: 116.5 GRAMS/M^2
BH CV ECHO MEAS - LV MAX PG: 2.3 MMHG
BH CV ECHO MEAS - LV MEAN PG: 1 MMHG
BH CV ECHO MEAS - LV SYSTOLIC VOL/BSA (12-30): 45.9 ML/M^2
BH CV ECHO MEAS - LV V1 MAX: 75.9 CM/SEC
BH CV ECHO MEAS - LV V1 MEAN: 46.5 CM/SEC
BH CV ECHO MEAS - LV V1 VTI: 15.2 CM
BH CV ECHO MEAS - LVIDD: 4.9 CM
BH CV ECHO MEAS - LVIDS: 3.5 CM
BH CV ECHO MEAS - LVLD AP2: 10.5 CM
BH CV ECHO MEAS - LVLD AP4: 9.7 CM
BH CV ECHO MEAS - LVLS AP2: 10 CM
BH CV ECHO MEAS - LVLS AP4: 9.4 CM
BH CV ECHO MEAS - LVOT AREA (M): 3.8 CM^2
BH CV ECHO MEAS - LVOT AREA: 3.8 CM^2
BH CV ECHO MEAS - LVOT DIAM: 2.2 CM
BH CV ECHO MEAS - LVPWD: 1.1 CM
BH CV ECHO MEAS - MED PEAK E' VEL: 4.7 CM/SEC
BH CV ECHO MEAS - MEDIAL E/E' RATIO: 15.4
BH CV ECHO MEAS - MV A MAX VEL: 91.8 CM/SEC
BH CV ECHO MEAS - MV DEC SLOPE: 468.1 CM/SEC^2
BH CV ECHO MEAS - MV DEC TIME: 0.19 SEC
BH CV ECHO MEAS - MV E MAX VEL: 75 CM/SEC
BH CV ECHO MEAS - MV E/A: 0.82
BH CV ECHO MEAS - MV MAX PG: 3.6 MMHG
BH CV ECHO MEAS - MV MEAN PG: 1.5 MMHG
BH CV ECHO MEAS - MV P1/2T MAX VEL: 67.5 CM/SEC
BH CV ECHO MEAS - MV P1/2T: 42.2 MSEC
BH CV ECHO MEAS - MV V2 MAX: 95.4 CM/SEC
BH CV ECHO MEAS - MV V2 MEAN: 57.8 CM/SEC
BH CV ECHO MEAS - MV V2 VTI: 17.3 CM
BH CV ECHO MEAS - MVA P1/2T LCG: 3.3 CM^2
BH CV ECHO MEAS - MVA(P1/2T): 5.2 CM^2
BH CV ECHO MEAS - MVA(VTI): 3.3 CM^2
BH CV ECHO MEAS - PA ACC SLOPE: 586.1 CM/SEC^2
BH CV ECHO MEAS - PA ACC TIME: 0.11 SEC
BH CV ECHO MEAS - PA MAX PG: 3 MMHG
BH CV ECHO MEAS - PA PR(ACCEL): 29.9 MMHG
BH CV ECHO MEAS - PA V2 MAX: 86.2 CM/SEC
BH CV ECHO MEAS - RAP SYSTOLE: 8 MMHG
BH CV ECHO MEAS - RVSP: 24 MMHG
BH CV ECHO MEAS - SI(AO): 85.9 ML/M^2
BH CV ECHO MEAS - SI(CUBED): 35.5 ML/M^2
BH CV ECHO MEAS - SI(LVOT): 27.3 ML/M^2
BH CV ECHO MEAS - SI(MOD-SP2): 28.2 ML/M^2
BH CV ECHO MEAS - SI(MOD-SP4): 36.8 ML/M^2
BH CV ECHO MEAS - SI(TEICH): 29.5 ML/M^2
BH CV ECHO MEAS - SV(AO): 179.8 ML
BH CV ECHO MEAS - SV(CUBED): 74.2 ML
BH CV ECHO MEAS - SV(LVOT): 57.1 ML
BH CV ECHO MEAS - SV(MOD-SP2): 59 ML
BH CV ECHO MEAS - SV(MOD-SP4): 77 ML
BH CV ECHO MEAS - SV(TEICH): 61.7 ML
BH CV ECHO MEAS - TAPSE (>1.6): 1.9 CM
BH CV ECHO MEAS - TR MAX PG: 16 MMHG
BH CV ECHO MEAS - TR MAX VEL: 198 CM/SEC
BH CV ECHO MEASUREMENTS AVERAGE E/E' RATIO: 14.02
BH CV VAS BP LEFT ARM: NORMAL MMHG
BH CV XLRA - RV BASE: 3.5 CM
BH CV XLRA - RV LENGTH: 6.7 CM
BH CV XLRA - RV MID: 2.7 CM
BH CV XLRA - TDI S': 9.1 CM/SEC
LEFT ATRIUM VOLUME INDEX: 31.5 ML/M^2
LEFT ATRIUM VOLUME: 66 ML
LV EF 2D ECHO EST: 40 %
MAXIMAL PREDICTED HEART RATE: 160 BPM
STRESS TARGET HR: 136 BPM

## 2020-12-21 NOTE — TELEPHONE ENCOUNTER
Spoke with patient, advised of below.      ----- Message from Jalil Guillen MD sent at 12/21/2020  1:29 PM EST -----  LVEF still 40%, would not recommend turning off ICD for this (not normal) EF

## 2021-03-04 ENCOUNTER — TELEPHONE (OUTPATIENT)
Dept: CARDIOLOGY | Facility: CLINIC | Age: 61
End: 2021-03-04

## 2021-03-04 NOTE — TELEPHONE ENCOUNTER
Called to speak with pt regarding his home monitor not being connected. No answer and could not leave a message.

## 2021-07-01 RX ORDER — LEVOTHYROXINE SODIUM 0.07 MG/1
75 TABLET ORAL DAILY
Qty: 90 TABLET | Refills: 0 | Status: SHIPPED | OUTPATIENT
Start: 2021-07-01

## 2023-08-02 NOTE — PROGRESS NOTES
Subjective   Favian Pierre is a 58 y.o. male.     Pt is here to [facility]. Pt was previously on Crestor metoprolol asprin isosorbide. Has been off everything except testosterone since Feb due to medication causing chest pain. Pt was seen by Cardiologist at [place] in January and told he has small vascular spasms when he does activity. Still has them but not as frequent since coming off medication. Cardiologist wanted him to fu with vascular for eval.     Pt needs referral to derm for spot on left ear and chest.    History of Present Illness this is a new patient here to establish care he is accompanied by his wife.  He just moved from [place] where he has had a large work-up for vasospastic angina he also reports he has a cardiac defibrillator in place.  He reports that his chest pain symptoms have improved somewhat.  He was placed on a long-acting nitroglycerin acid one-point and his symptoms improved for a while however they reoccurred after 3 to 6 months.  His previous cardiologist had asked him to see a vascular surgeon.  He is not currently established with a cardiologist in [place] or [place].    He also reports he has a lesion on his left ear that does not bleed has been present for a number of months it is not getting larger.  Reports he has a history of basal cell carcinoma on his nose and is requesting a referral to see dermatology.    He does not recall specific details of past cardiac work-up we will get previous records.    He also has a long history of chronic fatigue and hypogonadism he has been seen and been on testosterone replacement for the last 3 years    The following portions of the patient's history were reviewed and updated as appropriate: allergies, current medications, past family history, past medical history, past social history, past surgical history and problem list.    Review of Systems   Constitutional: Negative for chills, fatigue, fever and unexpected weight change.   HENT:  "Negative for congestion, ear pain, nosebleeds, rhinorrhea, sinus pressure, sneezing, sore throat and trouble swallowing.    Eyes: Negative for itching and visual disturbance.   Respiratory: Positive for chest tightness. Negative for cough, shortness of breath and wheezing.    Cardiovascular: Negative for chest pain, palpitations and leg swelling.   Gastrointestinal: Negative for abdominal pain, anal bleeding, blood in stool, constipation, diarrhea, nausea and vomiting.   Endocrine: Negative for cold intolerance, heat intolerance, polydipsia and polyuria.   Genitourinary: Negative for difficulty urinating, frequency, hematuria and urgency.   Musculoskeletal: Negative for back pain, gait problem and myalgias.   Skin: Negative for rash and wound.   Neurological: Negative for dizziness, weakness, numbness and headaches.   Hematological: Negative for adenopathy. Does not bruise/bleed easily.   Psychiatric/Behavioral: Negative for agitation, confusion, decreased concentration and suicidal ideas. The patient is not nervous/anxious.        Objective     Vitals:    04/29/19 1000   BP: 138/86   Pulse: 93   Temp: 98 °F (36.7 °C)   SpO2: 99%   Weight: 100 kg (220 lb 12.8 oz)   Height: 182.9 cm (72\")       Physical Exam   Constitutional: He is oriented to person, place, and time. He appears well-developed and well-nourished.   HENT:   Head: Normocephalic and atraumatic.   Eyes: Conjunctivae and EOM are normal. Pupils are equal, round, and reactive to light. Right eye exhibits no discharge. Left eye exhibits no discharge. No scleral icterus.   Neck: Normal range of motion. Neck supple. No JVD present. No tracheal deviation present. No thyromegaly present.   Cardiovascular: Normal rate, regular rhythm and normal heart sounds. Exam reveals no gallop and no friction rub.   No murmur heard.  Pulmonary/Chest: Effort normal and breath sounds normal. No respiratory distress. He has no wheezes. He has no rales. He exhibits no tenderness. "   Abdominal: Soft. Bowel sounds are normal. He exhibits no distension and no mass. There is no tenderness.   Musculoskeletal: Normal range of motion. He exhibits no edema.   Neurological: He is alert and oriented to person, place, and time. He has normal reflexes. He displays normal reflexes. No cranial nerve deficit. Coordination normal.   Skin: Skin is warm and dry.   He has a small hemangioma in the helix of his left ear he also has a hand hemangioma on his right chest   Psychiatric: He has a normal mood and affect. His behavior is normal. Judgment and thought content normal.   Nursing note and vitals reviewed.      Assessment/Plan     Problem List Items Addressed This Visit        Cardiovascular and Mediastinum    Prinzmetal's angina (CMS/HCC)    Relevant Medications    amLODIPine (NORVASC) 5 MG tablet    Other Relevant Orders    Ambulatory Referral to Vascular Surgery    CBC & Differential    TSH    Comprehensive Metabolic Panel    Hemoglobin A1c    Vitamin D 25 Hydroxy    Vitamin B12    POC Urinalysis Dipstick, Automated    Hyperlipidemia LDL goal <100    Relevant Orders    Lipid Panel    Presence of cardiac defibrillator    Relevant Orders    Ambulatory Referral to Cardiology       Digestive    Polyp of colon    Relevant Orders    Ambulatory Referral For Screening Colonoscopy       Endocrine    Hypogonadism, testicular - Primary    Relevant Orders    Ambulatory Referral to Endocrinology    Testosterone       Musculoskeletal and Integument    Basal cell carcinoma (BCC) of skin of face    Relevant Orders    Ambulatory Referral to Dermatology       Other    Encounter to establish care with new doctor           Picato Counseling:  I discussed with the patient the risks of Picato including but not limited to erythema, scaling, itching, weeping, crusting, and pain.

## (undated) DEVICE — MODEL BT2000 P/N 700287-012KIT CONTENTS: MANIFOLD WITH SALINE AND CONTRAST PORTS, SALINE TUBING WITH SPIKE AND HAND SYRINGE, TRANSDUCER: Brand: BT2000 AUTOMATED MANIFOLD KIT

## (undated) DEVICE — GLIDESHEATH BASIC HYDROPHILIC COATED INTRODUCER SHEATH: Brand: GLIDESHEATH

## (undated) DEVICE — CATH DIAG EXPO .056 FL3.5 6F 100CM

## (undated) DEVICE — CATH DIAG EXPO .045 FL3  5F 100CM

## (undated) DEVICE — PK CATH CARD 10

## (undated) DEVICE — MODEL AT P65, P/N 701554-001KIT CONTENTS: HAND CONTROLLER, 3-WAY HIGH-PRESSURE STOPCOCK WITH ROTATING END AND PREMIUM HIGH-PRESSURE TUBING: Brand: ANGIOTOUCH® KIT

## (undated) DEVICE — Device

## (undated) DEVICE — CATH DIAG EXPO M/ PK 6FR FL4/FR4 PIG 3PK

## (undated) DEVICE — DEV COMP RAD PRELUDESYNC 24CM